# Patient Record
Sex: FEMALE | Race: WHITE | Employment: UNEMPLOYED | URBAN - METROPOLITAN AREA
[De-identification: names, ages, dates, MRNs, and addresses within clinical notes are randomized per-mention and may not be internally consistent; named-entity substitution may affect disease eponyms.]

---

## 2018-11-09 PROBLEM — J45.909 ASTHMA AFFECTING PREGNANCY, ANTEPARTUM: Status: ACTIVE | Noted: 2018-11-09

## 2018-11-09 PROBLEM — Z34.01 PRIMIGRAVIDA IN FIRST TRIMESTER: Status: ACTIVE | Noted: 2018-11-09

## 2018-11-09 PROBLEM — O99.519 ASTHMA AFFECTING PREGNANCY, ANTEPARTUM: Status: ACTIVE | Noted: 2018-11-09

## 2018-11-15 PROBLEM — O99.321 DRUG USE AFFECTING PREGNANCY IN FIRST TRIMESTER: Status: ACTIVE | Noted: 2018-11-15

## 2018-11-15 PROBLEM — Z28.39 RUBELLA NON-IMMUNE STATUS, ANTEPARTUM: Status: ACTIVE | Noted: 2018-11-15

## 2018-11-15 PROBLEM — O09.899 RUBELLA NON-IMMUNE STATUS, ANTEPARTUM: Status: ACTIVE | Noted: 2018-11-15

## 2018-12-07 PROBLEM — O98.311: Status: ACTIVE | Noted: 2018-12-07

## 2018-12-07 PROBLEM — A60.09: Status: ACTIVE | Noted: 2018-12-07

## 2019-01-04 PROBLEM — O98.512 HERPES SIMPLEX VIRUS TYPE 2 (HSV-2) INFECTION AFFECTING PREGNANCY IN SECOND TRIMESTER: Status: ACTIVE | Noted: 2018-12-07

## 2019-01-04 PROBLEM — Z34.02 PRIMIGRAVIDA IN SECOND TRIMESTER: Status: ACTIVE | Noted: 2018-11-09

## 2019-01-04 PROBLEM — B00.9 HERPES SIMPLEX VIRUS TYPE 2 (HSV-2) INFECTION AFFECTING PREGNANCY IN SECOND TRIMESTER: Status: ACTIVE | Noted: 2018-12-07

## 2019-03-01 PROBLEM — O26.872 CERVICAL SHORTENING AFFECTING PREGNANCY IN SECOND TRIMESTER: Status: ACTIVE | Noted: 2019-03-01

## 2019-03-04 PROBLEM — O09.92 HIGH-RISK PREGNANCY IN SECOND TRIMESTER: Status: ACTIVE | Noted: 2018-11-09

## 2019-03-22 PROBLEM — J45.909 ASTHMA AFFECTING PREGNANCY, ANTEPARTUM: Status: RESOLVED | Noted: 2018-11-09 | Resolved: 2019-03-22

## 2019-03-22 PROBLEM — O99.519 ASTHMA AFFECTING PREGNANCY, ANTEPARTUM: Status: RESOLVED | Noted: 2018-11-09 | Resolved: 2019-03-22

## 2019-03-22 PROBLEM — O35.EXX0 HYDRONEPHROSIS OF FETUS IN SINGLETON PREGNANCY, ANTEPARTUM: Status: ACTIVE | Noted: 2019-03-22

## 2019-03-29 PROBLEM — O26.873 SHORT CERVIX IN THIRD TRIMESTER, ANTEPARTUM: Status: ACTIVE | Noted: 2019-03-01

## 2019-03-29 PROBLEM — Z34.03 PRIMIGRAVIDA IN THIRD TRIMESTER: Status: ACTIVE | Noted: 2018-11-09

## 2019-03-29 PROBLEM — O98.513 HERPES SIMPLEX VIRUS TYPE 2 (HSV-2) INFECTION AFFECTING PREGNANCY IN THIRD TRIMESTER: Status: ACTIVE | Noted: 2018-12-07

## 2019-04-19 PROBLEM — O09.93 HIGH-RISK PREGNANCY IN THIRD TRIMESTER: Status: ACTIVE | Noted: 2018-11-09

## 2019-04-30 ENCOUNTER — HOSPITAL ENCOUNTER (OUTPATIENT)
Age: 19
Discharge: HOME | End: 2019-04-30
Attending: OBSTETRICS & GYNECOLOGY | Admitting: OBSTETRICS & GYNECOLOGY
Payer: COMMERCIAL

## 2019-04-30 VITALS
HEART RATE: 104 BPM | TEMPERATURE: 99.6 F | DIASTOLIC BLOOD PRESSURE: 68 MMHG | HEIGHT: 60 IN | SYSTOLIC BLOOD PRESSURE: 124 MMHG | WEIGHT: 197 LBS | BODY MASS INDEX: 38.68 KG/M2

## 2019-04-30 PROBLEM — R10.11: Status: ACTIVE | Noted: 2019-04-30

## 2019-04-30 PROBLEM — O26.899: Status: ACTIVE | Noted: 2019-04-30

## 2019-04-30 LAB
ALBUMIN SERPL-MCNC: 2.5 G/DL (ref 3.5–5)
ALBUMIN/GLOB SERPL: 0.6 {RATIO}
ALP SERPL-CCNC: 75 U/L (ref 50–130)
ALT SERPL-CCNC: 21 U/L (ref 12–65)
AMYLASE SERPL-CCNC: 44 U/L (ref 25–115)
ANION GAP SERPL CALC-SCNC: 9 MMOL/L
AST SERPL-CCNC: 19 U/L (ref 15–37)
BASOPHILS # BLD: 0 K/UL (ref 0–0.2)
BASOPHILS NFR BLD: 0 % (ref 0–2)
BILIRUB SERPL-MCNC: 0.2 MG/DL (ref 0.2–1.1)
BUN SERPL-MCNC: 6 MG/DL (ref 6–23)
CALCIUM SERPL-MCNC: 8.8 MG/DL (ref 8.3–10.4)
CHLORIDE SERPL-SCNC: 106 MMOL/L (ref 98–107)
CO2 SERPL-SCNC: 24 MMOL/L (ref 21–32)
CREAT SERPL-MCNC: 0.42 MG/DL (ref 0.6–1)
DIFFERENTIAL METHOD BLD: ABNORMAL
EOSINOPHIL # BLD: 0.1 K/UL (ref 0–0.8)
EOSINOPHIL NFR BLD: 1 % (ref 0.5–7.8)
ERYTHROCYTE [DISTWIDTH] IN BLOOD BY AUTOMATED COUNT: 13.4 % (ref 11.9–14.6)
GLOBULIN SER CALC-MCNC: 4.2 G/DL (ref 2.3–3.5)
GLUCOSE SERPL-MCNC: 95 MG/DL (ref 65–100)
HCT VFR BLD AUTO: 34.5 % (ref 35.8–46.3)
HGB BLD-MCNC: 11.5 G/DL (ref 11.7–15.4)
IMM GRANULOCYTES # BLD AUTO: 0.1 K/UL (ref 0–0.5)
IMM GRANULOCYTES NFR BLD AUTO: 1 % (ref 0–5)
LYMPHOCYTES # BLD: 1.5 K/UL (ref 0.5–4.6)
LYMPHOCYTES NFR BLD: 13 % (ref 13–44)
MCH RBC QN AUTO: 31.3 PG (ref 26.1–32.9)
MCHC RBC AUTO-ENTMCNC: 33.3 G/DL (ref 31.4–35)
MCV RBC AUTO: 93.8 FL (ref 79.6–97.8)
MONOCYTES # BLD: 0.7 K/UL (ref 0.1–1.3)
MONOCYTES NFR BLD: 6 % (ref 4–12)
NEUTS SEG # BLD: 9.4 K/UL (ref 1.7–8.2)
NEUTS SEG NFR BLD: 80 % (ref 43–78)
NRBC # BLD: 0 K/UL (ref 0–0.2)
PLATELET # BLD AUTO: 240 K/UL (ref 150–450)
PMV BLD AUTO: 10.3 FL (ref 9.4–12.3)
POTASSIUM SERPL-SCNC: 3.2 MMOL/L (ref 3.5–5.1)
PROT SERPL-MCNC: 6.7 G/DL
RBC # BLD AUTO: 3.68 M/UL (ref 4.05–5.2)
SODIUM SERPL-SCNC: 139 MMOL/L (ref 136–145)
URATE SERPL-MCNC: 3.1 MG/DL (ref 2.6–6)
WBC # BLD AUTO: 11.8 K/UL (ref 4.3–11.1)

## 2019-04-30 PROCEDURE — 99284 EMERGENCY DEPT VISIT MOD MDM: CPT

## 2019-04-30 PROCEDURE — 84550 ASSAY OF BLOOD/URIC ACID: CPT

## 2019-04-30 PROCEDURE — 76815 OB US LIMITED FETUS(S): CPT

## 2019-04-30 PROCEDURE — 80053 COMPREHEN METABOLIC PANEL: CPT

## 2019-04-30 PROCEDURE — 59025 FETAL NON-STRESS TEST: CPT

## 2019-04-30 PROCEDURE — 82150 ASSAY OF AMYLASE: CPT

## 2019-04-30 PROCEDURE — 85025 COMPLETE CBC W/AUTO DIFF WBC: CPT

## 2019-04-30 NOTE — PROGRESS NOTES
Pt to [facility] ED with complaints of right upper quadrant pain, radiating into her back, onset at 2000 yesterday. Pt report floaters in left eye. Pt denies uc's, vaginal bleeding or leaking. + FM. dtr's 2+/2+ and absent clonus. Lungs ctab.

## 2019-04-30 NOTE — ED PROVIDER NOTES
Chief Complaint:right upper quadrant abd pain 
 
 
19 y.o. female G1 at 33w0d 
weeks gestation who is seen for moderate abdominal pain. Pt reports right upper quad abd pain since this am. Positional changes make pain worse. No alleviating factors. Good recent appetite. No nausea, vomiting, diarrhea or constipation. Pt reports seeing occasional spots. Reports sweating, but no known fever at home. Good fetal movement. No c/o of headache or swelling. HISTORY: 
 
Social History Substance and Sexual Activity Sexual Activity Yes  Partners: Male Patient's last menstrual period was 09/04/2018 (lmp unknown). Social History Socioeconomic History  Marital status: SINGLE Spouse name: Not on file  Number of children: Not on file  Years of education: Not on file  Highest education level: Not on file Occupational History  Not on file Social Needs  Financial resource strain: Not on file  Food insecurity:  
  Worry: Not on file Inability: Not on file  Transportation needs:  
  Medical: Not on file Non-medical: Not on file Tobacco Use  Smoking status: Never Smoker  Smokeless tobacco: Never Used Substance and Sexual Activity  Alcohol use: No  
  Frequency: Never  Drug use: No  
 Sexual activity: Yes  
  Partners: Male Lifestyle  Physical activity:  
  Days per week: Not on file Minutes per session: Not on file  Stress: Not on file Relationships  Social connections:  
  Talks on phone: Not on file Gets together: Not on file Attends Quaker service: Not on file Active member of club or organization: Not on file Attends meetings of clubs or organizations: Not on file Relationship status: Not on file  Intimate partner violence:  
  Fear of current or ex partner: Not on file Emotionally abused: Not on file Physically abused: Not on file Forced sexual activity: Not on file Other Topics Concern [address] Service Not Asked  Blood Transfusions Not Asked  Caffeine Concern No  
 Occupational Exposure Not Asked Sushila Graham Hazards Not Asked  Sleep Concern Not Asked  Stress Concern Not Asked  Weight Concern Not Asked  Special Diet Not Asked  Back Care Not Asked  Exercise No  
 Bike Helmet Not Asked [address] Yes  Self-Exams Yes Social History Narrative Abuse: Feels safe at home, no history of physical abuse, no history of sexual abuse History reviewed. No pertinent surgical history. Past Medical History:  
Diagnosis Date  Asthma  Herpes gestationis   
 last outbreak was 2 weeks ago  Herpes simplex virus (HSV) infection  HSV-2 seropositive ROS: 
A 12 point review of symptoms negative except for chief complaint as described above. PHYSICAL EXAM: 
Blood pressure 124/68, pulse (!) 104, temperature 99.6 °F (37.6 °C), height 5' (1.524 m), weight 89.4 kg (197 lb), last menstrual period 09/04/2018. Constitutional: The patient appears well, alert, oriented x 3. Cardiovascular: Heart RRR, no murmurs. Respiratory: Lungs clear, no respiratory distress GI: Abdomen soft, nontender except for tenderness to palpation ruq, no guarding No fundal tenderness Musculoskeletal: no cva tenderness Upper ext: no edema, reflexes +2 Lower ext: no edema, neg derek's, reflexes +2 Skin: no rashes or lesions Psychiatric:Mood/ Affect: appropriate Genitourinary: SVE:cl/th/high (external os 1, internal os closed) FHT: 120's mod variability, accels; reactive, cat 1 
TOCO:no contractions UA- sp grav greater than 1.030, +1 prot, trace ketones Brief bedside ultrasound- vertex, chanel = 12, lots of fetal movement, grade 2 ant , fundal placenta- no pain with ultrasound Recent Results (from the past 12 hour(s)) CBC WITH AUTOMATED DIFF Collection Time: 04/30/19  7:42 PM  
Result Value Ref Range WBC 11.8 (H) 4.3 - 11.1 K/uL  
 RBC 3.68 (L) 4.05 - 5.2 M/uL HGB 11.5 (L) 11.7 - 15.4 g/dL HCT 34.5 (L) 35.8 - 46.3 % MCV 93.8 79.6 - 97.8 FL  
 MCH 31.3 26.1 - 32.9 PG  
 MCHC 33.3 31.4 - 35.0 g/dL  
 RDW 13.4 11.9 - 14.6 % PLATELET 870 123 - 736 K/uL MPV 10.3 9.4 - 12.3 FL ABSOLUTE NRBC 0.00 0.0 - 0.2 K/uL  
 DF AUTOMATED NEUTROPHILS 80 (H) 43 - 78 % LYMPHOCYTES 13 13 - 44 % MONOCYTES 6 4.0 - 12.0 % EOSINOPHILS 1 0.5 - 7.8 % BASOPHILS 0 0.0 - 2.0 % IMMATURE GRANULOCYTES 1 0.0 - 5.0 %  
 ABS. NEUTROPHILS 9.4 (H) 1.7 - 8.2 K/UL  
 ABS. LYMPHOCYTES 1.5 0.5 - 4.6 K/UL  
 ABS. MONOCYTES 0.7 0.1 - 1.3 K/UL  
 ABS. EOSINOPHILS 0.1 0.0 - 0.8 K/UL  
 ABS. BASOPHILS 0.0 0.0 - 0.2 K/UL  
 ABS. IMM. GRANS. 0.1 0.0 - 0.5 K/UL METABOLIC PANEL, COMPREHENSIVE Collection Time: 19  7:42 PM  
Result Value Ref Range Sodium 139 136 - 145 mmol/L Potassium 3.2 (L) 3.5 - 5.1 mmol/L Chloride 106 98 - 107 mmol/L  
 CO2 24 21 - 32 mmol/L Anion gap 9 mmol/L Glucose 95 65 - 100 mg/dL BUN 6 6 - 23 MG/DL Creatinine 0.42 (L) 0.6 - 1.0 MG/DL  
 GFR est AA >60 >60 ml/min/1.73m2 GFR est non-AA >60 ml/min/1.73m2 Calcium 8.8 8.3 - 10.4 MG/DL Bilirubin, total 0.2 0.2 - 1.1 MG/DL  
 ALT (SGPT) 21 12 - 65 U/L  
 AST (SGOT) 19 15 - 37 U/L Alk. phosphatase 75 50 - 130 U/L Protein, total 6.7 g/dL Albumin 2.5 (L) 3.5 - 5.0 g/dL Globulin 4.2 (H) 2.3 - 3.5 g/dL A-G Ratio 0.6 AMYLASE Collection Time: 19  7:42 PM  
Result Value Ref Range Amylase 44 25 - 115 U/L  
URIC ACID Collection Time: 19  7:42 PM  
Result Value Ref Range Uric acid 3.1 2.6 - 6.0 MG/DL I personally reviewed pt's medical record including relevant labs and ultrasounds Assessment/Plan: 
 
18 yo G1 at 33 weeks with right upper quad abd pain Normal uric acid and amylase, normal lfts No obvious cholecystatis based on labs, good appetite and exam 
No evidence  labor Encouraged hydration, encouraged potassium rich foods Return if fever or increase in pain

## 2019-05-01 NOTE — PROGRESS NOTES
Patient discharged home self care. Patient verbalizes understanding of discharge instructions. Patient ambulated out

## 2019-05-01 NOTE — DISCHARGE INSTRUCTIONS
Patient Education     Patient Education     Belly Pain in Pregnancy: Care Instructions  Your Care Instructions  When you're pregnant, any belly pain can be a worry. You may not want to call your doctor about every pain you have. But you don't want to miss something that is dangerous for you or your baby. Even if it feels familiar, belly pain can mean something new when you're pregnant. It's important to know when to call your doctor. It will also help to know how to care for yourself at home when your pain is not caused by anything harmful. · When belly pain is more severe or constant, see a doctor right away. · If you're sure your belly pain is a sign of labor, call your doctor. · When belly pain is brief, it's usually a normal part of pregnancy. It might be related to changes in the growing uterus. Or it could be the stretching of ligaments called round ligaments. These ligaments help support the uterus. Round ligament pain can be on either side of your belly. It can also be felt in your hips or groin. Follow-up care is a key part of your treatment and safety. Be sure to make and go to all appointments, and call your doctor if you are having problems. It's also a good idea to know your test results and keep a list of the medicines you take. How can you tell if belly pain is a sign of labor? When belly pain is caused by labor, it can feel like mild or menstrual-like cramps in your lower belly. These cramps are probably contractions. They can happen in your second or third trimester. You may also have:  · A steady, dull ache in your lower back, pelvis, or thighs. · A feeling of pressure in your pelvis or lower belly. · Changes in your vaginal discharge or a sudden release of fluid from the vagina. If you think you are in labor, call your doctor. How can you care for yourself at home? When belly pain is mild and is not a symptom of labor:  · Rest until you feel better. · Take a warm bath.   · Think about what you drink and eat:  ¨ Drink plenty of fluids. Choose water and other caffeine-free clear liquids until you feel better. ¨ Try eating small, frequent meals. If your stomach is upset, try bland, low-fat foods like plain rice, broiled chicken, toast, and yogurt. · Think about how you move if you are having brief pains from stretching of the round ligaments. ¨ Try gentle stretching. ¨ Move a little more slowly when turning in bed or getting up from a chair, so those ligaments don't stretch quickly. ¨ Lean forward a bit if you think you are going to cough or sneeze. When should you call for help? Call 911 anytime you think you may need emergency care. For example, call if:  · You have sudden, severe pain in your belly. · You have severe vaginal bleeding. Call your doctor now or seek immediate medical care if:  · You have new or worse belly pain or cramping. · You have any vaginal bleeding. · You have a fever. · You have symptoms of preeclampsia, such as:  ¨ Sudden swelling of your face, hands, or feet. ¨ New vision problems (such as dimness or blurring). ¨ A severe headache. · You think that you may be in labor. This means that you've had at least 8 contractions within 1 hour or at least 4 contractions within 20 minutes, even after you change your position and drink fluids. · You have symptoms of a urinary tract infection. These may include:  ¨ Pain or burning when you urinate. ¨ A frequent need to urinate without being able to pass much urine. ¨ Pain in the flank, which is just below the rib cage and above the waist on either side of the back. ¨ Blood in your urine. Watch closely for changes in your health, and be sure to contact your doctor if you are worried about your or your baby's health. Where can you learn more? Go to Sm[employer] Remarketer.be  Enter B275 in the search box to learn more about \"Belly Pain in Pregnancy: Care Instructions. \"   © 9244-7188 Healthwise, Incorporated. Care instructions adapted under license by [employer] (which disclaims liability or warranty for this information). This care instruction is for use with your licensed healthcare professional. If you have questions about a medical condition or this instruction, always ask your healthcare professional. [address] any warranty or liability for your use of this information. Content Version: 36.6.592631; Current as of: 2015              Pregnancy Precautions: Care Instructions  Your Care Instructions    There is no sure way to prevent labor before your due date ( labor) or to prevent most other pregnancy problems. But there are things you can do to increase your chances of a healthy pregnancy. Go to your appointments, follow your doctor's advice, and take good care of yourself. Eat well, and exercise (if your doctor agrees). And make sure to drink plenty of water. Follow-up care is a key part of your treatment and safety. Be sure to make and go to all appointments, and call your doctor if you are having problems. It's also a good idea to know your test results and keep a list of the medicines you take. How can you care for yourself at home? · Make sure you go to your prenatal appointments. At each visit, your doctor will check your blood pressure. Your doctor will also check to see if you have protein in your urine. High blood pressure and protein in urine are signs of preeclampsia. This condition can be dangerous for you and your baby. · Drink plenty of fluids, enough so that your urine is light yellow or clear like water. Dehydration can cause contractions. If you have kidney, heart, or liver disease and have to limit fluids, talk with your doctor before you increase the amount of fluids you drink. · Tell your doctor right away if you notice any symptoms of an infection, such as:  ? Burning when you urinate.   ? A foul-smelling discharge from your vagina. ? Vaginal itching. ? Unexplained fever. ? Unusual pain or soreness in your uterus or lower belly. · Eat a balanced diet. Include plenty of foods that are high in calcium and iron. ? Foods high in calcium include milk, cheese, yogurt, almonds, and broccoli. ? Foods high in iron include red meat, shellfish, poultry, eggs, beans, raisins, whole-grain bread, and leafy green vegetables. · Do not smoke. If you need help quitting, talk to your doctor about stop-smoking programs and medicines. These can increase your chances of quitting for good. · Do not drink alcohol or use illegal drugs. · Follow your doctor's directions about activity. Your doctor will let you know how much, if any, exercise you can do. · Ask your doctor if you can have sex. If you are at risk for early labor, your doctor may ask you to not have sex. · Take care to prevent falls. During pregnancy, your joints are loose, and your balance is off. Sports such as bicycling, skiing, or in-line skating can increase your risk of falling. And don't ride horses or motorcycles, dive, water ski, scuba dive, or parachute jump while you are pregnant. · Avoid getting very hot. Do not use saunas or hot tubs. Avoid staying out in the sun in hot weather for long periods. Take acetaminophen (Tylenol) to lower a high fever. · Do not take any over-the-counter or herbal medicines or supplements without talking to your doctor or pharmacist first.  When should you call for help? Call 911 anytime you think you may need emergency care. For example, call if:    · You passed out (lost consciousness).     · You have severe vaginal bleeding.     · You have severe pain in your belly or pelvis.     · You have had fluid gushing or leaking from your vagina and you know or think the umbilical cord is bulging into your vagina. If this happens, immediately get down on your knees so your rear end (buttocks) is higher than your head.  This will decrease the pressure on the cord until help arrives.   [facility] your doctor now or seek immediate medical care if:    · You have signs of preeclampsia, such as:  ? Sudden swelling of your face, hands, or feet. ? New vision problems (such as dimness or blurring). ? A severe headache.     · You have any vaginal bleeding.     · You have belly pain or cramping.     · You have a fever.     · You have had regular contractions (with or without pain) for an hour. This means that you have 8 or more within 1 hour or 4 or more in 20 minutes after you change your position and drink fluids.     · You have a sudden release of fluid from your vagina.     · You have low back pain or pelvic pressure that does not go away.     · You notice that your baby has stopped moving or is moving much less than normal.    Watch closely for changes in your health, and be sure to contact your doctor if you have any problems. Where can you learn more? Go to http://olga lidia-annie.info/. Enter [id number] in the search box to learn more about \"Pregnancy Precautions: Care Instructions. \"  Current as of: September 5, 2018  Content Version: 11.9  © 7425-7985 [employer]. Care instructions adapted under license by [employer] (which disclaims liability or warranty for this information). If you have questions about a medical condition or this instruction, always ask your healthcare professional. [address] any warranty or liability for your use of this information.

## 2019-05-13 PROBLEM — O26.843 SIZE OF FETUS INCONSISTENT WITH DATES IN THIRD TRIMESTER: Status: ACTIVE | Noted: 2019-05-13

## 2019-05-13 PROBLEM — R10.11: Status: RESOLVED | Noted: 2019-04-30 | Resolved: 2019-05-13

## 2019-05-13 PROBLEM — O26.899: Status: RESOLVED | Noted: 2019-04-30 | Resolved: 2019-05-13

## 2019-05-14 ENCOUNTER — HOSPITAL ENCOUNTER (OUTPATIENT)
Dept: LACTATION | Age: 19
Discharge: HOME | End: 2019-05-14
Attending: OBSTETRICS & GYNECOLOGY
Payer: COMMERCIAL

## 2019-05-14 PROCEDURE — E0603 ELECTRIC BREAST PUMP: HCPCS

## 2019-05-14 NOTE — LACTATION NOTE
Prenatal Breastfeeding Consult [phone] Patient:  eLonora Dumas Gestational Age:  27 weeks today EDC:  6/18/19 Diagnosis:  V 24.1/ Z 39.1 Pregnant Preventative Prenatal Consult. Returning to work and pumping. Double Electric Breastpump  Dr. Mart Correa Patient here for education about use of a breastpump. Provided patient with double electric breastpump. Reviewed usage and indications for pumping. Demonstrated use of pump. Mother expressed understanding. Will follow up as needed.

## 2019-05-28 PROBLEM — Z22.330 GBS CARRIER: Status: ACTIVE | Noted: 2019-05-28

## 2019-06-11 NOTE — PROGRESS NOTES
Patient ID verified. Allergies, medical history, prenatal record and prior to admission medications verified. Pt instructed to be NPO after midnight. Pt instructed to arrive at hospital @0600,come to entrance C and sign in at the registration desk on the 4th floor. Patient instructed to come to hospital sooner if SROM, labor, or concerning symptoms. Patient verbalized understanding. Questions encouraged and answered.

## 2019-06-12 ENCOUNTER — ANESTHESIA EVENT (OUTPATIENT)
Dept: LABOR AND DELIVERY | Age: 19
End: 2019-06-12
Payer: COMMERCIAL

## 2019-06-12 ENCOUNTER — ANESTHESIA (OUTPATIENT)
Dept: LABOR AND DELIVERY | Age: 19
End: 2019-06-12
Payer: COMMERCIAL

## 2019-06-12 ENCOUNTER — HOSPITAL ENCOUNTER (INPATIENT)
Age: 19
LOS: 2 days | Discharge: HOME | End: 2019-06-14
Attending: OBSTETRICS & GYNECOLOGY | Admitting: OBSTETRICS & GYNECOLOGY
Payer: COMMERCIAL

## 2019-06-12 PROBLEM — Z3A.39 39 WEEKS GESTATION OF PREGNANCY: Status: ACTIVE | Noted: 2019-06-12

## 2019-06-12 LAB
ABO + RH BLD: NORMAL
ARTERIAL PATENCY WRIST A: ABNORMAL
BASE DEFICIT BLD-SCNC: 3 MMOL/L
BDY SITE: ABNORMAL
BLOOD GROUP ANTIBODIES SERPL: NORMAL
BODY TEMPERATURE: 98.6
CO2 BLD-SCNC: 24 MMOL/L
COLLECT TIME,HTIME: 839
ERYTHROCYTE [DISTWIDTH] IN BLOOD BY AUTOMATED COUNT: 13.2 % (ref 11.9–14.6)
GAS FLOW.O2 O2 DELIVERY SYS: ABNORMAL L/MIN
HCO3 BLDV-SCNC: 23.1 MMOL/L (ref 23–28)
HCT VFR BLD AUTO: 36.1 % (ref 35.8–46.3)
HGB BLD-MCNC: 12.1 G/DL (ref 11.7–15.4)
MCH RBC QN AUTO: 30.8 PG (ref 26.1–32.9)
MCHC RBC AUTO-ENTMCNC: 33.5 G/DL (ref 31.4–35)
MCV RBC AUTO: 91.9 FL (ref 79.6–97.8)
NRBC # BLD: 0 K/UL (ref 0–0.2)
PCO2 BLDCO: 44 MMHG (ref 32–68)
PH BLDCO: 7.33 [PH] (ref 7.15–7.38)
PLATELET # BLD AUTO: 216 K/UL (ref 150–450)
PMV BLD AUTO: 11.2 FL (ref 9.4–12.3)
PO2 BLDCO: 14 MMHG
RBC # BLD AUTO: 3.93 M/UL (ref 4.05–5.2)
SAO2 % BLDV: 14 % (ref 65–88)
SERVICE CMNT-IMP: ABNORMAL
SPECIMEN EXP DATE BLD: NORMAL
SPECIMEN TYPE: ABNORMAL
WBC # BLD AUTO: 9.5 K/UL (ref 4.3–11.1)

## 2019-06-12 PROCEDURE — 86900 BLOOD TYPING SEROLOGIC ABO: CPT

## 2019-06-12 PROCEDURE — 74011250636 HC RX REV CODE- 250/636

## 2019-06-12 PROCEDURE — 74011250637 HC RX REV CODE- 250/637: Performed by: ANESTHESIOLOGY

## 2019-06-12 PROCEDURE — 76060000078 HC EPIDURAL ANESTHESIA: Performed by: OBSTETRICS & GYNECOLOGY

## 2019-06-12 PROCEDURE — 77030018836 HC SOL IRR NACL ICUM -A: Performed by: OBSTETRICS & GYNECOLOGY

## 2019-06-12 PROCEDURE — 74011000250 HC RX REV CODE- 250

## 2019-06-12 PROCEDURE — 74011250636 HC RX REV CODE- 250/636: Performed by: ANESTHESIOLOGY

## 2019-06-12 PROCEDURE — 77030002933 HC SUT MCRYL J&J -A: Performed by: OBSTETRICS & GYNECOLOGY

## 2019-06-12 PROCEDURE — 74011250636 HC RX REV CODE- 250/636: Performed by: OBSTETRICS & GYNECOLOGY

## 2019-06-12 PROCEDURE — 74011250637 HC RX REV CODE- 250/637: Performed by: OBSTETRICS & GYNECOLOGY

## 2019-06-12 PROCEDURE — 77030018846 HC SOL IRR STRL H20 ICUM -A: Performed by: OBSTETRICS & GYNECOLOGY

## 2019-06-12 PROCEDURE — 75410000003 HC RECOV DEL/VAG/CSECN EA 0.5 HR: Performed by: OBSTETRICS & GYNECOLOGY

## 2019-06-12 PROCEDURE — 77030007880 HC KT SPN EPDRL BBMI -B: Performed by: NURSE ANESTHETIST, CERTIFIED REGISTERED

## 2019-06-12 PROCEDURE — 77030009363 HC CUP VAC EXTRCT CNCI -B: Performed by: OBSTETRICS & GYNECOLOGY

## 2019-06-12 PROCEDURE — 59510 CESAREAN DELIVERY: CPT | Performed by: OBSTETRICS & GYNECOLOGY

## 2019-06-12 PROCEDURE — 77030003665 HC NDL SPN BBMI -A: Performed by: NURSE ANESTHETIST, CERTIFIED REGISTERED

## 2019-06-12 PROCEDURE — 76010000391 HC C SECN FIRST 1 HR: Performed by: OBSTETRICS & GYNECOLOGY

## 2019-06-12 PROCEDURE — 77030011943: Performed by: OBSTETRICS & GYNECOLOGY

## 2019-06-12 PROCEDURE — 85027 COMPLETE CBC AUTOMATED: CPT

## 2019-06-12 PROCEDURE — 65270000029 HC RM PRIVATE

## 2019-06-12 PROCEDURE — 77030031139 HC SUT VCRL2 J&J -A: Performed by: OBSTETRICS & GYNECOLOGY

## 2019-06-12 PROCEDURE — 74011000250 HC RX REV CODE- 250: Performed by: ANESTHESIOLOGY

## 2019-06-12 PROCEDURE — 82803 BLOOD GASES ANY COMBINATION: CPT

## 2019-06-12 PROCEDURE — 77030032490 HC SLV COMPR SCD KNE COVD -B: Performed by: OBSTETRICS & GYNECOLOGY

## 2019-06-12 PROCEDURE — 4A1HXCZ MONITORING OF PRODUCTS OF CONCEPTION, CARDIAC RATE, EXTERNAL APPROACH: ICD-10-PCS | Performed by: OBSTETRICS & GYNECOLOGY

## 2019-06-12 PROCEDURE — 77030002966 HC SUT PDS J&J -A: Performed by: OBSTETRICS & GYNECOLOGY

## 2019-06-12 RX ORDER — OXYTOCIN/RINGER'S LACTATE 30/500 ML
250 PLASTIC BAG, INJECTION (ML) INTRAVENOUS ONCE
Status: DISCONTINUED | OUTPATIENT
Start: 2019-06-12 | End: 2019-06-12 | Stop reason: HOSPADM

## 2019-06-12 RX ORDER — MORPHINE SULFATE 1 MG/ML
INJECTION, SOLUTION EPIDURAL; INTRATHECAL; INTRAVENOUS
Status: COMPLETED | OUTPATIENT
Start: 2019-06-12 | End: 2019-06-12

## 2019-06-12 RX ORDER — EPHEDRINE SULFATE 50 MG/ML
INJECTION, SOLUTION INTRAVENOUS AS NEEDED
Status: DISCONTINUED | OUTPATIENT
Start: 2019-06-12 | End: 2019-06-12 | Stop reason: HOSPADM

## 2019-06-12 RX ORDER — SIMETHICONE 80 MG
80 TABLET,CHEWABLE ORAL
Status: DISCONTINUED | OUTPATIENT
Start: 2019-06-12 | End: 2019-06-14 | Stop reason: HOSPADM

## 2019-06-12 RX ORDER — HYDROMORPHONE HYDROCHLORIDE 2 MG/ML
1 INJECTION, SOLUTION INTRAMUSCULAR; INTRAVENOUS; SUBCUTANEOUS
Status: DISCONTINUED | OUTPATIENT
Start: 2019-06-12 | End: 2019-06-13 | Stop reason: ALTCHOICE

## 2019-06-12 RX ORDER — OXYCODONE HYDROCHLORIDE 5 MG/1
10 TABLET ORAL
Status: DISCONTINUED | OUTPATIENT
Start: 2019-06-12 | End: 2019-06-13 | Stop reason: ALTCHOICE

## 2019-06-12 RX ORDER — SODIUM CHLORIDE 0.9 % (FLUSH) 0.9 %
5-40 SYRINGE (ML) INJECTION EVERY 8 HOURS
Status: DISCONTINUED | OUTPATIENT
Start: 2019-06-12 | End: 2019-06-12 | Stop reason: HOSPADM

## 2019-06-12 RX ORDER — TRISODIUM CITRATE DIHYDRATE AND CITRIC ACID MONOHYDRATE 500; 334 MG/5ML; MG/5ML
30 SOLUTION ORAL ONCE
Status: COMPLETED | OUTPATIENT
Start: 2019-06-12 | End: 2019-06-12

## 2019-06-12 RX ORDER — BUPIVACAINE HYDROCHLORIDE 7.5 MG/ML
INJECTION, SOLUTION INTRASPINAL
Status: COMPLETED | OUTPATIENT
Start: 2019-06-12 | End: 2019-06-12

## 2019-06-12 RX ORDER — SODIUM CHLORIDE, SODIUM LACTATE, POTASSIUM CHLORIDE, CALCIUM CHLORIDE 600; 310; 30; 20 MG/100ML; MG/100ML; MG/100ML; MG/100ML
125 INJECTION, SOLUTION INTRAVENOUS CONTINUOUS
Status: DISCONTINUED | OUTPATIENT
Start: 2019-06-12 | End: 2019-06-12 | Stop reason: HOSPADM

## 2019-06-12 RX ORDER — DEXTROSE, SODIUM CHLORIDE, SODIUM LACTATE, POTASSIUM CHLORIDE, AND CALCIUM CHLORIDE 5; .6; .31; .03; .02 G/100ML; G/100ML; G/100ML; G/100ML; G/100ML
125 INJECTION, SOLUTION INTRAVENOUS CONTINUOUS
Status: DISCONTINUED | OUTPATIENT
Start: 2019-06-12 | End: 2019-06-12 | Stop reason: HOSPADM

## 2019-06-12 RX ORDER — OXYTOCIN/RINGER'S LACTATE 30/500 ML
PLASTIC BAG, INJECTION (ML) INTRAVENOUS
Status: DISCONTINUED | OUTPATIENT
Start: 2019-06-12 | End: 2019-06-12 | Stop reason: HOSPADM

## 2019-06-12 RX ORDER — VALACYCLOVIR HYDROCHLORIDE 500 MG/1
500 TABLET, FILM COATED ORAL 2 TIMES DAILY
Status: DISCONTINUED | OUTPATIENT
Start: 2019-06-12 | End: 2019-06-14 | Stop reason: HOSPADM

## 2019-06-12 RX ORDER — MORPHINE SULFATE 0.5 MG/ML
INJECTION, SOLUTION EPIDURAL; INTRATHECAL; INTRAVENOUS AS NEEDED
Status: DISCONTINUED | OUTPATIENT
Start: 2019-06-12 | End: 2019-06-12 | Stop reason: HOSPADM

## 2019-06-12 RX ORDER — ONDANSETRON 2 MG/ML
INJECTION INTRAMUSCULAR; INTRAVENOUS AS NEEDED
Status: DISCONTINUED | OUTPATIENT
Start: 2019-06-12 | End: 2019-06-12 | Stop reason: HOSPADM

## 2019-06-12 RX ORDER — DIPHENHYDRAMINE HYDROCHLORIDE 50 MG/ML
12.5 INJECTION, SOLUTION INTRAMUSCULAR; INTRAVENOUS
Status: DISCONTINUED | OUTPATIENT
Start: 2019-06-12 | End: 2019-06-13 | Stop reason: ALTCHOICE

## 2019-06-12 RX ORDER — SODIUM CHLORIDE 0.9 % (FLUSH) 0.9 %
5-40 SYRINGE (ML) INJECTION AS NEEDED
Status: DISCONTINUED | OUTPATIENT
Start: 2019-06-12 | End: 2019-06-12 | Stop reason: HOSPADM

## 2019-06-12 RX ORDER — NALOXONE HYDROCHLORIDE 0.4 MG/ML
0.2 INJECTION, SOLUTION INTRAMUSCULAR; INTRAVENOUS; SUBCUTANEOUS
Status: DISCONTINUED | OUTPATIENT
Start: 2019-06-12 | End: 2019-06-13 | Stop reason: ALTCHOICE

## 2019-06-12 RX ORDER — CEFAZOLIN SODIUM/WATER 2 G/20 ML
2 SYRINGE (ML) INTRAVENOUS ONCE
Status: COMPLETED | OUTPATIENT
Start: 2019-06-12 | End: 2019-06-12

## 2019-06-12 RX ORDER — SODIUM CHLORIDE, SODIUM LACTATE, POTASSIUM CHLORIDE, CALCIUM CHLORIDE 600; 310; 30; 20 MG/100ML; MG/100ML; MG/100ML; MG/100ML
25 INJECTION, SOLUTION INTRAVENOUS CONTINUOUS
Status: DISCONTINUED | OUTPATIENT
Start: 2019-06-12 | End: 2019-06-13

## 2019-06-12 RX ORDER — KETOROLAC TROMETHAMINE 30 MG/ML
30 INJECTION, SOLUTION INTRAMUSCULAR; INTRAVENOUS
Status: DISCONTINUED | OUTPATIENT
Start: 2019-06-12 | End: 2019-06-13 | Stop reason: ALTCHOICE

## 2019-06-12 RX ORDER — KETOROLAC TROMETHAMINE 30 MG/ML
INJECTION, SOLUTION INTRAMUSCULAR; INTRAVENOUS AS NEEDED
Status: DISCONTINUED | OUTPATIENT
Start: 2019-06-12 | End: 2019-06-12 | Stop reason: HOSPADM

## 2019-06-12 RX ADMIN — MORPHINE SULFATE 0.15 MG: 1 INJECTION, SOLUTION EPIDURAL; INTRATHECAL; INTRAVENOUS at 08:26

## 2019-06-12 RX ADMIN — SODIUM CHLORIDE, SODIUM LACTATE, POTASSIUM CHLORIDE, AND CALCIUM CHLORIDE 25 ML/HR: 600; 310; 30; 20 INJECTION, SOLUTION INTRAVENOUS at 09:48

## 2019-06-12 RX ADMIN — PROMETHAZINE HYDROCHLORIDE 12.5 MG: 25 INJECTION INTRAMUSCULAR; INTRAVENOUS at 15:16

## 2019-06-12 RX ADMIN — KETOROLAC TROMETHAMINE 30 MG: 30 INJECTION, SOLUTION INTRAMUSCULAR; INTRAVENOUS at 08:57

## 2019-06-12 RX ADMIN — EPHEDRINE SULFATE 25 MG: 50 INJECTION, SOLUTION INTRAVENOUS at 08:59

## 2019-06-12 RX ADMIN — KETOROLAC TROMETHAMINE 30 MG: 30 INJECTION, SOLUTION INTRAMUSCULAR at 17:27

## 2019-06-12 RX ADMIN — OXYCODONE HYDROCHLORIDE 10 MG: 5 TABLET ORAL at 10:43

## 2019-06-12 RX ADMIN — ONDANSETRON 4 MG: 2 INJECTION INTRAMUSCULAR; INTRAVENOUS at 08:40

## 2019-06-12 RX ADMIN — SODIUM CHLORIDE, SODIUM LACTATE, POTASSIUM CHLORIDE, AND CALCIUM CHLORIDE 1000 ML: 600; 310; 30; 20 INJECTION, SOLUTION INTRAVENOUS at 08:03

## 2019-06-12 RX ADMIN — HYDROMORPHONE HYDROCHLORIDE 1 MG: 2 INJECTION INTRAMUSCULAR; INTRAVENOUS; SUBCUTANEOUS at 12:21

## 2019-06-12 RX ADMIN — Medication 2 G: at 08:03

## 2019-06-12 RX ADMIN — BUPIVACAINE HYDROCHLORIDE 12 MG: 7.5 INJECTION, SOLUTION INTRASPINAL at 08:26

## 2019-06-12 RX ADMIN — MORPHINE SULFATE 150 MCG: 0.5 INJECTION, SOLUTION EPIDURAL; INTRATHECAL; INTRAVENOUS at 08:51

## 2019-06-12 RX ADMIN — EPHEDRINE SULFATE 10 MG: 50 INJECTION, SOLUTION INTRAVENOUS at 08:50

## 2019-06-12 RX ADMIN — VALACYCLOVIR HYDROCHLORIDE 500 MG: 500 TABLET, FILM COATED ORAL at 20:23

## 2019-06-12 RX ADMIN — SODIUM CITRATE AND CITRIC ACID MONOHYDRATE 30 ML: 500; 334 SOLUTION ORAL at 08:03

## 2019-06-12 RX ADMIN — OXYCODONE HYDROCHLORIDE 10 MG: 5 TABLET ORAL at 20:23

## 2019-06-12 RX ADMIN — Medication 500 ML/HR: at 08:40

## 2019-06-12 RX ADMIN — SODIUM CHLORIDE, SODIUM LACTATE, POTASSIUM CHLORIDE, AND CALCIUM CHLORIDE: 600; 310; 30; 20 INJECTION, SOLUTION INTRAVENOUS at 08:15

## 2019-06-12 RX ADMIN — EPHEDRINE SULFATE 10 MG: 50 INJECTION, SOLUTION INTRAVENOUS at 08:57

## 2019-06-12 NOTE — OP NOTES
Karolina Ospina  2000    Preop Dx:   1. IUP @ 39 1/7 weeks gestation   2. Active genital HSV    Postop Dx: Same    Procedure: Primary LTCS    Surgeon: Tom Moya      Anesthesia: spinel    EBL: 600 mL  IVF: 1000 mL  UOP: 755 mL    Complications: None    Findings:  Viable Male infant. Vtx position. APGARS 8,9.  Weight:  7 lbs, 10 oz. Normal appearing uterus, tubes and ovaries. Procedure:  Pt was taken to the operating room where spinl anesthesia was found to be adequate. She was then prepped and draped in the usual sterile fashion and placed in the supine position with a leftward tilt. A Pfannenstiel skin incision was made with the scalpel and carried down to the underlying layer of fascia with the bovie. The fascia was incised in the midline and the incision extended laterally with the mims scissors. Superior of the fascial incision was grasped with Kocher clamps and  from the rectus muscles sharply and bluntly. In a similar fashion, the inferior aspect of the fascial incision was grasped with the Kocher clamps and  from the rectus muscles sharply and bluntly. The rectus muscles were  in the midline bluntly and peritoneum entered bluntly. The peritoneal incision was extended manually. Bladder blade was inserted and lower uterine incision made with the scalpel. Incision extended manually laterally. Flat vacuum used due to fetal head size. Infants head delivered atraumatically. Nose and mouth suctioned. Cord clamped and cut. Infant handed off to the waiting NICU staff. The uterus was exteriorized and cleared of all clots and debris. Hysterotomy was closed with 0-vicryl in a running, locking fashion. A second layer of 0-vicryl was placed in a running fashion to imbricate the incision. The pelvis and gutters were cleared of all clots and debris, the uterus returned to the abdomen and hemostasis was assured throughout. Intercede was placed over the hysterotomy site. Fascial incision repaired with 0-PDS in a running fashion. Subcutaneous tissue was cleared of all clots and debris and hemostasis assured. Subcutaneous tissue reapproximated with 3-0 vicryl rapide in a running fashion. Skin closed with 4-0 monocryl in a subcuticular fashion. Pt tolerated procedure well. Sponge, lap and needle counts correct x 3. Disposition: Pt to RR stable and infant to  nursery stable.     Pathology: cord gases      Noah Hale MD  9:04 AM  19

## 2019-06-12 NOTE — L&D DELIVERY NOTE
Delivery Summary    Patient: Yanci Dawkins MRN: [id number]  SSN: [id number]    YOB: 2000  Age: 19 y.o. Sex: female        Information for the patient's :  Eliana Palomo [[id number]]       Labor Events:    Labor: No    Steroids:     Cervical Ripening Date/Time:       Cervical Ripening Type: None   Antibiotics During Labor: No   Rupture Identifier: Sac 1    Rupture Date/Time: 2019 8:37 AM   Rupture Type: AROM   Amniotic Fluid Volume: Moderate    Amniotic Fluid Description: Clear    Amniotic Fluid Odor: None    Induction: None       Induction Date/Time:        Indications for Induction:      Augmentation: None   Augmentation Date/Time:      Indications for Augmentation:     Labor complications: None       Additional complications:        Delivery Events:  Indications For Episiotomy:     Episiotomy: None   Perineal Laceration(s): None   Repaired:     Periurethral Laceration Location:      Repaired:     Labial Laceration Location:     Repaired:     Sulcal Laceration Location:     Repaired:     Vaginal Laceration Location:     Repaired:     Cervical Laceration Location:     Repaired:     Repair Suture: None   Number of Repair Packets:     Estimated Blood Loss (ml):  ml     Delivery Date: 2019    Delivery Time: 8:39 AM   Delivery Type: , Low Transverse     Details    Trial of Labor: No   Primary/Repeat: Primary   Priority: Routine   Indications:          Sex:  Male     Gestational Age: 36w3d  Delivery Clinician:  Lucila Escobedo  Living Status: Living   Delivery Location: OR            APGARS  One minute Five minutes Ten minutes   Skin color: 0   1        Heart rate: 2   2        Grimace: 2   2        Muscle tone: 2   2        Breathin   2        Totals: 8   9          Presentation: Vertex    Position:        Resuscitation Method:  Suctioning-bulb; Tactile Stimulation     Meconium Stained: None      Cord Information: 3 Vessels  Complications:    Cord around:    Delayed cord clamping? No  Cord clamped date/time:2019  8:39 AM  Disposition of Cord Blood: Lab    Blood Gases Sent?: Yes    Placenta:  Date/Time: 2019  8:40 AM  Removal: Manual Removal      Appearance:       Ruffin Measurements:  Birth Weight: 7 lb 10.2 oz (3.465 kg)      Birth Length: 1' 8.87\" (0.53 m)      Head Circumference: 1' 2.17\" (0.36 m)      Chest Circumference: 1' 0.99\" (0.33 m)     Abdominal Girth:       Other Providers:   RADHA CARTER;NOHEMI REYES;TIFFANY DAILEY;PETERSON BETANCOURT;MIRTHA DEAL;ALEXEI MONREAL;SAMIR CLARKE;ALEJANDRINA THOMPSON;SANDY BAIRD, Obstetrician;Primary Nurse;Primary Ruffin Nurse;Neonatologist;Anesthesiologist;Crna;Scrub Tech;Scrub Tech;Respiratory Therapist             Group B Strep: No results found for: GRBSEXT, GRBSEXT  Information for the patient's :  Daniella Doctor [[id number]]   No results found for: ABORH, PCTABR, PCTDIG, BILI, ABORHEXT, ABORH    Recent Labs     19  0848   PCO2CB 44   PO2CB 14   IBD 3   PTEMPI 98.6   SPECTI VENOUS CORD   PHICB 7.329   ISITE CORD   IDEV ROOM AIR   IALLEN NOT APPLICABLE

## 2019-06-12 NOTE — ROUTINE PROCESS
SBAR IN Report: Mother Verbal report received from Rosendo Norwood, [address] [place] [address] (full name & credentials) on this patient, who is now being transferred from L & D (unit) for routine progression of care. The patient is wearing a green \"Anesthesia-Duramorph\" band. Report consisted of patient's Situation, Background, Assessment and Recommendations (SBAR).  ID bands were compared with the identification form, and verified with the patient and transferring nurse. Information from the SBAR and the [place] Report was reviewed with the transferring nurse; opportunity for questions and clarification provided.

## 2019-06-12 NOTE — INTERVAL H&P NOTE
I have examined and spoken with the patient this morning. She has no new medical issues or complaints. She reports no new medicines since H&P. She again understands procedure and agrees to proceed. Yvonne Mc MD 
7:59 AM 
06/12/19

## 2019-06-12 NOTE — H&P
[facility] OB H&P      Assessment/Plan    Problem List  Date Reviewed: 2019          Codes Class    39 weeks gestation of pregnancy ICD-10-CM: Z3A.39  ICD-9-CM: V22.2         GBS carrier ICD-10-CM: Z22.330  ICD-9-CM: V02.51     Overview Signed 2019 10:16 AM by Kim Martinez NP     Tx intrapartum             Size of fetus inconsistent with dates in third trimester ICD-10-CM: O26.843  ICD-9-CM: 649.63     Overview Addendum 2019  1:34 PM by Kim Martinez NP     19:  FH 37 cm at 34 6/7 weeks, US next visit    2019: EFW 60%, AC 55%, ANDRÉS nl, VERTEX             Hydronephrosis of fetus in baum pregnancy, antepartum ICD-10-CM: O35. 8XX0  ICD-9-CM: 655.83     Overview Addendum 2019 10:22 AM by Emory Kimball MD     3/22/2019 at [facility]: Mild unilateral; reassess at [address]. 2019 at [facility]:  Prominent renal pelves (left 7.0 mm, right 5.8 mm). Extensive discussion regarding ultrasound findings. Likely etiology of moderate hydronephrosis being partial obstruction of ureter or ureteral valve stenosis. No masses in pelvis or renal cysts seen. Normal ANDRÉS. Anatomy otherwise within normal limits. No other markers of aneuploidy seen. Unilateral hydronephrosis is not a marker for aneuploidy. · Appointment with [facility] will need to be made following delivery as non-emergent outpatient follow up of  for hydronephrosis. Please contact Janki Alvarez, [phone], to schedule this prior to 's discharge home from hospital.                Short cervix in third trimester, antepartum ICD-10-CM: O26.873  ICD-9-CM: 649.73     Overview Addendum 2019 10:36 AM by Chris Nguyen MD     3/1/2019: funneling from 4.4 cm to 2.4 cm, stat referral to [facility] for appt Monday, starting Indocin 50mg Q 6 hours, strict pelvic rest, and PTL precautions    3/4/2019 at [facility]:  CL 3.3 cm, no funneling. Reports occasional \"tightening\" to lower abdomen (< 3-4x's entire pregnancy). Denies history of surgery/trauma to cervix. Completed Indocin course today. 3/22/2019 at [facility]:  AC 35%, overall 50%, ANDRÉS 19 cm, UA Dopplers WNL,CL 2.4 cm with funneling Patient reports no PTL symptoms today. Mild unilateral hydronephrosis with Left kidney 5.6cm. Right 4 cm    · PTL and cervical insufficiency precautions given. · Reassess kidneys at 28 weeks at [facility]  · Cervical Length with Primary OB for symptoms             Herpes simplex virus type 2 (HSV-2) infection affecting pregnancy in third trimester ICD-10-CM: O98.513, B00.9  ICD-9-CM: 647.63, 054.9     Overview Signed 2018 11:19 AM by Justus Godinez NP     Starting prophylaxis              Drug use affecting pregnancy in first trimester ICD-10-CM: O99.321  ICD-9-CM: 648.43, 305.90     Overview Addendum 2019 11:02 AM by Rosa Plummer MD     (+) cannabis on initial OBV -- repeat later in preg (3/29 - NEG)             Rubella non-immune status, antepartum ICD-10-CM: O99.89, Z28.3  ICD-9-CM: 646.83, V15.83     Overview Signed 11/15/2018  8:19 AM by Rosa Plummer MD     imm PP             Primigravida in third trimester ICD-10-CM: Z34.03  ICD-9-CM: V22.0     Overview Addendum 2019  9:35 AM by Rosa Plummer MD     [facility] by 8 3/7 week US (unsure LMP)  Plans breastfeeding, OCPs, TDAP recommended    19:  SVE --- 3/60/-3                   Subjective    Arlinmalinda Youpe [facility] [age] [facility] y.o.  39w1d  presented to L&D for primary C/S due to active HSV. Pt has no complaints today. Pt denies vaginal bleeding/discharge. No LOF.  +FM.       OB History    Para Term  AB Living   1 0 0 0 0 0   SAB TAB Ectopic Molar Multiple Live Births   0 0 0 0 0 0      # Outcome Date GA Lbr Ross/2nd Weight Sex Delivery Anes PTL Lv   1 Current                Past Medical History:   Diagnosis Date    Asthma     Herpes gestationis     last outbreak was 2 weeks ago    Herpes simplex virus (HSV) infection     HSV-2 seropositive        No past surgical history on file.     Family History   Problem Relation Age of Onset    Asthma Mother     No Known Problems Father     Breast Cancer Neg Hx     Ovarian Cancer Neg Hx     Uterine Cancer Neg Hx     Colon Cancer Neg Hx        Social History     Socioeconomic History    Marital status: SINGLE     Spouse name: Not on file    Number of children: Not on file    Years of education: Not on file    Highest education level: Not on file   Occupational History    Not on file   Social Needs    Financial resource strain: Not on file    Food insecurity:     Worry: Not on file     Inability: Not on file    Transportation needs:     Medical: Not on file     Non-medical: Not on file   Tobacco Use    Smoking status: Never Smoker    Smokeless tobacco: Never Used   Substance and Sexual Activity    Alcohol use: No     Frequency: Never    Drug use: No    Sexual activity: Yes     Partners: Male   Lifestyle    Physical activity:     Days per week: Not on file     Minutes per session: Not on file    Stress: Not on file   Relationships    Social connections:     Talks on phone: Not on file     Gets together: Not on file     Attends Tenriism service: Not on file     Active member of club or organization: Not on file     Attends meetings of clubs or organizations: Not on file     Relationship status: Not on file    Intimate partner violence:     Fear of current or ex partner: Not on file     Emotionally abused: Not on file     Physically abused: Not on file     Forced sexual activity: Not on file   Other Topics Concern     Service Not Asked    Blood Transfusions Not Asked    Caffeine Concern No    Occupational Exposure Not Asked   Hettie Model Hazards Not Asked    Sleep Concern Not Asked    Stress Concern Not Asked    Weight Concern Not Asked    Special Diet Not Asked    Back Care Not Asked    Exercise No    Bike Helmet Not Asked    Seat Belt Yes    Self-Exams Yes   Social History Narrative Abuse: Feels safe at home, no history of physical abuse, no history of sexual abuse       No Known Allergies      Review of Systems:    Constitutional: No fevers or chills     Prenatal: + fetal movement, no VB/DC, no LOF     CV: No chest pain or palpatations    Resp: No SOB or cough    GI: No nausea/vomiting/diarrhea/constipation    Neuro: No HA, no seizure like activity    Skin: No rashes or lesions     Breast: No breast pain    : No dysuria or hematuria      Prenatal Record Review    The prenatal record has been reviewed. Prenatal Labs:   No results found for: RUBELLAEXT, GRBSEXT, HBSAGEXT, HIVEXT, RPREXT, GONNOEXT, CHLAMEXT    Objective    Visit Vitals  /71   Pulse 98   Temp 98.5 °F (36.9 °C)   Resp 16   LMP 09/04/2018 (LMP Unknown)   Breastfeeding?  Yes         Physical Exam    Gen: alert and cooperative, NAD    HEENT: NCAT    CV: RRR    RESP: CTA bilat    ABD: Gravid, soft, NT    EXT: trace edema bilat    NEURO: No focal deficits    SKIN: No noted rashes or lesions       Susan Washburn MD  7:58 AM  06/12/19

## 2019-06-12 NOTE — ANESTHESIA PROCEDURE NOTES
Spinal Block    Start time: 6/12/2019 8:18 AM  End time: 6/12/2019 8:26 AM  Performed by: Misha Weldon MD  Authorized by: Misha Weldon MD     Pre-procedure:   Indications: primary anesthetic  Preanesthetic Checklist: patient identified, risks and benefits discussed, anesthesia consent, site marked, patient being monitored and timeout performed    Timeout Time: 08:18          Spinal Block:   Patient Position:  Seated  Prep Region:  Lumbar  Prep: chlorhexidine      Location:  L3-4  Technique:  Single shot    Local Dose (mL):  1    Needle:   Needle Type:  Pencan  Needle Gauge:  25 G  Attempts:  1      Events: CSF confirmed, no blood with aspiration and no paresthesia        Assessment:  Insertion:  Uncomplicated  Patient tolerance:  Patient tolerated the procedure well with no immediate complications

## 2019-06-12 NOTE — LACTATION NOTE

## 2019-06-12 NOTE — PROGRESS NOTES
SBAR OUT Report: Mother    Verbal report given to Chey Duke RN on this patient, who is now being transferred to MIU for routine progression of care. The patient is wearing a green \"Anesthesia-Duramorph\" band. Report consisted of patient's Situation, Background, Assessment and Recommendations (SBAR).  ID bands were compared with the identification form, and verified with the patient and receiving nurse. Information from the SBAR, OR Summary, Procedure Summary, Intake/Output and MAR and the Julio Report was reviewed with the receiving nurse; opportunity for questions and clarification provided.

## 2019-06-12 NOTE — PROGRESS NOTES
Admission assessment complete as noted. Patient oriented to room and unit. Plan of care reviewed and patient verbalizes understanding. Questions encouraged and answered. Patent encouraged to call for needs or concerns. Safety Teaching reviewed:   1. Hand hygiene prior to handling the infant. 2. Bracelets with matching numbers are placed on mother and infant  3. An infant security tag  [facility]) is placed on the infant's ankle and monitored  4. All OB nurses wear pink Employee badges - do not give your baby to anyone without proper identification. 5. Never leave the baby alone in the room. 6. The infant should be placed on their back to sleep. on a firm mattress. No toys should be placed in the crib. (safe sleep video offered to view)  7. Never shake the baby (video offered to view)  8. Infant fall prevention - do not sleep with the baby, and place the baby in the crib while ambulating. 5. Mother and Baby Care booklet given to Mother.

## 2019-06-12 NOTE — PROGRESS NOTES
delivery of viable male infant, apgars 8/9. Infant to warmer to NICU team for assessment. Initial assessment performed by Dr. Glen Li, neonatologist; see MD note.

## 2019-06-12 NOTE — PROGRESS NOTES
Pt c/o nausea, emesis bag given. Pt sitting up in bed breastfeeding. Pt requesting medication to help with nausea, primary RN made aware of the above.

## 2019-06-12 NOTE — ANESTHESIA PREPROCEDURE EVALUATION
Relevant Problems   No relevant active problems       Anesthetic History   No history of anesthetic complications            Review of Systems / Medical History  Patient summary reviewed, nursing notes reviewed and pertinent labs reviewed    Pulmonary            Asthma : well controlled       Neuro/Psych   Within defined limits           Cardiovascular                  Exercise tolerance: >4 METS     GI/Hepatic/Renal  Within defined limits              Endo/Other  Within defined limits           Other Findings              Physical Exam    Airway  Mallampati: II  TM Distance: 4 - 6 cm  Neck ROM: normal range of motion   Mouth opening: Normal     Cardiovascular    Rhythm: regular           Dental  No notable dental hx       Pulmonary  Breath sounds clear to auscultation               Abdominal  GI exam deferred       Other Findings            Anesthetic Plan    ASA: 2  Anesthesia type: spinal            Anesthetic plan and risks discussed with: Patient and Spouse

## 2019-06-12 NOTE — LACTATION NOTE
In to see mom and infant for the first time. Infant was latched on mom's left breast in the football hold and sucking rhythmically when I entered the room. Reviewed with mom and dad the expectations of the first 24 hours and answered questions. Lactation consultant will follow up tomorrow.

## 2019-06-13 LAB
ARTERIAL PATENCY WRIST A: ABNORMAL
BASE DEFICIT BLD-SCNC: 4 MMOL/L
BDY SITE: ABNORMAL
BODY TEMPERATURE: 98.6
CO2 BLD-SCNC: 27 MMOL/L
COLLECT TIME,HTIME: 839
ERYTHROCYTE [DISTWIDTH] IN BLOOD BY AUTOMATED COUNT: 13.7 % (ref 11.9–14.6)
GAS FLOW.O2 O2 DELIVERY SYS: ABNORMAL L/MIN
HCO3 BLD-SCNC: 24.9 MMOL/L (ref 22–26)
HCT VFR BLD AUTO: 28.9 % (ref 35.8–46.3)
HGB BLD-MCNC: 9.4 G/DL (ref 11.7–15.4)
MCH RBC QN AUTO: 31.1 PG (ref 26.1–32.9)
MCHC RBC AUTO-ENTMCNC: 32.5 G/DL (ref 31.4–35)
MCV RBC AUTO: 95.7 FL (ref 79.6–97.8)
NRBC # BLD: 0 K/UL (ref 0–0.2)
PCO2 BLDCO: 60 MMHG (ref 32–68)
PH BLDCO: 7.23 [PH] (ref 7.15–7.38)
PLATELET # BLD AUTO: 153 K/UL (ref 150–450)
PMV BLD AUTO: 11 FL (ref 9.4–12.3)
PO2 BLDCO: 9 MMHG
RBC # BLD AUTO: 3.02 M/UL (ref 4.05–5.2)
SAO2 % BLD: 6 % (ref 95–98)
SERVICE CMNT-IMP: ABNORMAL
SPECIMEN TYPE: ABNORMAL
WBC # BLD AUTO: 8.3 K/UL (ref 4.3–11.1)

## 2019-06-13 PROCEDURE — 74011250636 HC RX REV CODE- 250/636: Performed by: ANESTHESIOLOGY

## 2019-06-13 PROCEDURE — 85027 COMPLETE CBC AUTOMATED: CPT

## 2019-06-13 PROCEDURE — 36415 COLL VENOUS BLD VENIPUNCTURE: CPT

## 2019-06-13 PROCEDURE — 74011250637 HC RX REV CODE- 250/637: Performed by: OBSTETRICS & GYNECOLOGY

## 2019-06-13 PROCEDURE — 74011250637 HC RX REV CODE- 250/637: Performed by: ANESTHESIOLOGY

## 2019-06-13 PROCEDURE — 65270000029 HC RM PRIVATE

## 2019-06-13 RX ORDER — OXYCODONE HYDROCHLORIDE 5 MG/1
5-10 TABLET ORAL
Status: DISCONTINUED | OUTPATIENT
Start: 2019-06-13 | End: 2019-06-14 | Stop reason: HOSPADM

## 2019-06-13 RX ORDER — ALBUTEROL SULFATE 0.83 MG/ML
2.5 SOLUTION RESPIRATORY (INHALATION)
Status: DISCONTINUED | OUTPATIENT
Start: 2019-06-13 | End: 2019-06-14 | Stop reason: HOSPADM

## 2019-06-13 RX ORDER — SODIUM CHLORIDE 0.9 % (FLUSH) 0.9 %
5-40 SYRINGE (ML) INJECTION EVERY 8 HOURS
Status: DISCONTINUED | OUTPATIENT
Start: 2019-06-13 | End: 2019-06-13 | Stop reason: ALTCHOICE

## 2019-06-13 RX ORDER — ZOLPIDEM TARTRATE 5 MG/1
5 TABLET ORAL
Status: DISCONTINUED | OUTPATIENT
Start: 2019-06-13 | End: 2019-06-14 | Stop reason: HOSPADM

## 2019-06-13 RX ORDER — PRENATAL VIT 96/IRON FUM/FOLIC 27MG-0.8MG
1 TABLET ORAL DAILY
Status: DISCONTINUED | OUTPATIENT
Start: 2019-06-13 | End: 2019-06-13 | Stop reason: ALTCHOICE

## 2019-06-13 RX ORDER — SODIUM CHLORIDE, SODIUM LACTATE, POTASSIUM CHLORIDE, CALCIUM CHLORIDE 600; 310; 30; 20 MG/100ML; MG/100ML; MG/100ML; MG/100ML
150 INJECTION, SOLUTION INTRAVENOUS CONTINUOUS
Status: DISCONTINUED | OUTPATIENT
Start: 2019-06-13 | End: 2019-06-13 | Stop reason: ALTCHOICE

## 2019-06-13 RX ORDER — IBUPROFEN 600 MG/1
600 TABLET ORAL
Status: DISCONTINUED | OUTPATIENT
Start: 2019-06-13 | End: 2019-06-14 | Stop reason: HOSPADM

## 2019-06-13 RX ORDER — DOCUSATE SODIUM 100 MG/1
100 CAPSULE, LIQUID FILLED ORAL 2 TIMES DAILY
Status: DISCONTINUED | OUTPATIENT
Start: 2019-06-13 | End: 2019-06-14 | Stop reason: HOSPADM

## 2019-06-13 RX ORDER — SODIUM CHLORIDE 0.9 % (FLUSH) 0.9 %
5-40 SYRINGE (ML) INJECTION AS NEEDED
Status: DISCONTINUED | OUTPATIENT
Start: 2019-06-13 | End: 2019-06-13 | Stop reason: ALTCHOICE

## 2019-06-13 RX ORDER — MORPHINE SULFATE 10 MG/ML
5 INJECTION, SOLUTION INTRAMUSCULAR; INTRAVENOUS
Status: DISCONTINUED | OUTPATIENT
Start: 2019-06-13 | End: 2019-06-13 | Stop reason: ALTCHOICE

## 2019-06-13 RX ORDER — DIPHENHYDRAMINE HCL 25 MG
25 CAPSULE ORAL
Status: DISCONTINUED | OUTPATIENT
Start: 2019-06-13 | End: 2019-06-14 | Stop reason: HOSPADM

## 2019-06-13 RX ORDER — PRENATAL VIT 96/IRON FUM/FOLIC 27MG-0.8MG
1 TABLET ORAL DAILY
Status: DISCONTINUED | OUTPATIENT
Start: 2019-06-13 | End: 2019-06-14 | Stop reason: HOSPADM

## 2019-06-13 RX ADMIN — KETOROLAC TROMETHAMINE 30 MG: 30 INJECTION, SOLUTION INTRAMUSCULAR at 05:57

## 2019-06-13 RX ADMIN — OXYCODONE HYDROCHLORIDE 10 MG: 5 TABLET ORAL at 11:55

## 2019-06-13 RX ADMIN — VALACYCLOVIR HYDROCHLORIDE 500 MG: 500 TABLET, FILM COATED ORAL at 16:19

## 2019-06-13 RX ADMIN — OXYCODONE HYDROCHLORIDE 5 MG: 5 TABLET ORAL at 04:41

## 2019-06-13 RX ADMIN — IBUPROFEN 600 MG: 600 TABLET ORAL at 17:26

## 2019-06-13 RX ADMIN — KETOROLAC TROMETHAMINE 30 MG: 30 INJECTION, SOLUTION INTRAMUSCULAR at 00:04

## 2019-06-13 RX ADMIN — OXYCODONE HYDROCHLORIDE 5 MG: 5 TABLET ORAL at 02:20

## 2019-06-13 RX ADMIN — IBUPROFEN 600 MG: 600 TABLET ORAL at 11:58

## 2019-06-13 RX ADMIN — OXYCODONE HYDROCHLORIDE 10 MG: 5 TABLET ORAL at 21:07

## 2019-06-13 RX ADMIN — OXYCODONE HYDROCHLORIDE 10 MG: 5 TABLET ORAL at 16:18

## 2019-06-13 RX ADMIN — DOCUSATE SODIUM 100 MG: 100 CAPSULE, LIQUID FILLED ORAL at 17:26

## 2019-06-13 NOTE — PROGRESS NOTES
Chart reviewed-first time parent.  met with family and provided education on [facility].  Family declined referral for home visit.  provided informational packet on  mood disorder education/resources. Family receptive to receiving information and denied any additional needs from . Family has 's contact information should any needs/questions arise. No PCP - list of PCPs provided.     JM Murphy  [facility]   [phone]

## 2019-06-13 NOTE — PROGRESS NOTES
Patient is POD 1 s/p  and received neuraxial morphine for post-op pain control. Visit Vitals  /56 (BP 1 Location: Left arm, BP Patient Position: Sitting) Comment (BP Patient Position): sitting up in bed    Pulse 98   Temp 36.7 °C (98 °F)   Resp 16   LMP 2018 (LMP Unknown)   SpO2 96%   Breastfeeding? Yes   resting comfortably this morning, airway patent, appropriately hydrated and appears euvolemic. She reports minimal incisional pain. Patient reports minimal pruritis and minimal nausea. Her lower extremities have returned to baseline neurologically. She was satisfied with the anesthetic and reports no complications. Continue current orders.

## 2019-06-13 NOTE — LACTATION NOTE
In to follow up with mom and infant. Infant recently circumcised and asleep in the crib. Mom stated that infant latched and nursed well during the night but was sleepy. Reviewed the second night of life and answered questions. Mom stated that she feels confident and has no concerns at this time. Lactation consultant will follow up tomorrow.

## 2019-06-13 NOTE — ANESTHESIA POSTPROCEDURE EVALUATION
Procedure(s):   SECTION. spinal    Anesthesia Post Evaluation      Multimodal analgesia: multimodal analgesia used between 6 hours prior to anesthesia start to PACU discharge  Patient location during evaluation: PACU  Patient participation: complete - patient participated  Level of consciousness: awake and alert  Pain management: adequate  Airway patency: patent  Anesthetic complications: no  Cardiovascular status: acceptable  Respiratory status: acceptable  Hydration status: acceptable  Post anesthesia nausea and vomiting:  none      No vitals data found for the desired time range.

## 2019-06-13 NOTE — PROGRESS NOTES
Pt is S/P primary  at 39 1/7 weeks due to active HSV. No complaints today. Normal PO pain. No GI/ issues. Lochia < menses. No F/C. Visit Vitals  /60 (BP 1 Location: Left arm, BP Patient Position: At rest)   Pulse 98   Temp 98 °F (36.7 °C)   Resp 16   SpO2 96%   Breastfeeding? Yes        CV - RRR  LUNGS - CTA bilaterally  ABD - soft, NABS, appropriate tenderness, incision C/D/I  EXT - tr edema bilaterally    Labs:    Recent Results (from the past 24 hour(s))   BLOOD GAS, CORD BLOOD    Collection Time: 19  8:48 AM   Result Value Ref Range    Device: ROOM AIR      pH, cord blood (POC) 7.329 7. 15 - 7.38      pCO2 cord blood 44 32 - 68 mmHg    pO2 cord blood 14 mmHg    HCO3, venous (POC) 23.1 23 - 28 MMOL/L    sO2, venous (POC) 14 (L) 65 - 88 %    Base deficit (POC) 3 mmol/L    Allens test (POC) NOT APPLICABLE      Site CORD      Patient temp. 98.6      Specimen type (POC) VENOUS CORD      Performed by Jadene Fabry     CO2, POC 24 MMOL/L    COLLECT TIME 839     CBC W/O DIFF    Collection Time: 19  7:02 AM   Result Value Ref Range    WBC 8.3 4.3 - 11.1 K/uL    RBC 3.02 (L) 4.05 - 5.2 M/uL    HGB 9.4 (L) 11.7 - 15.4 g/dL    HCT 28.9 (L) 35.8 - 46.3 %    MCV 95.7 79.6 - 97.8 FL    MCH 31.1 26.1 - 32.9 PG    MCHC 32.5 31.4 - 35.0 g/dL    RDW 13.7 11.9 - 14.6 %    PLATELET 797 192 - 597 K/uL    MPV 11.0 9.4 - 12.3 FL    ABSOLUTE NRBC 0.00 0.0 - 0.2 K/uL       POD #1 LTCS     Pt is breast feeding and plans on P4 method for birth control. Stable, cont.  routine PP/PO care    Isa Gardiner MD  8:24 AM  19

## 2019-06-14 VITALS
RESPIRATION RATE: 18 BRPM | SYSTOLIC BLOOD PRESSURE: 86 MMHG | TEMPERATURE: 98.6 F | OXYGEN SATURATION: 96 % | HEART RATE: 97 BPM | DIASTOLIC BLOOD PRESSURE: 49 MMHG

## 2019-06-14 PROCEDURE — 74011250637 HC RX REV CODE- 250/637: Performed by: OBSTETRICS & GYNECOLOGY

## 2019-06-14 RX ORDER — IBUPROFEN 600 MG/1
600 TABLET ORAL
Qty: 60 TAB | Refills: 1 | Status: SHIPPED | OUTPATIENT
Start: 2019-06-14 | End: 2020-08-18

## 2019-06-14 RX ORDER — OXYCODONE AND ACETAMINOPHEN 5; 325 MG/1; MG/1
1 TABLET ORAL
Qty: 28 TAB | Refills: 0 | Status: SHIPPED | OUTPATIENT
Start: 2019-06-14 | End: 2019-06-21

## 2019-06-14 RX ADMIN — OXYCODONE HYDROCHLORIDE 10 MG: 5 TABLET ORAL at 00:52

## 2019-06-14 RX ADMIN — IBUPROFEN 600 MG: 600 TABLET ORAL at 00:06

## 2019-06-14 RX ADMIN — OXYCODONE HYDROCHLORIDE 10 MG: 5 TABLET ORAL at 09:51

## 2019-06-14 RX ADMIN — OXYCODONE HYDROCHLORIDE 10 MG: 5 TABLET ORAL at 06:01

## 2019-06-14 RX ADMIN — VALACYCLOVIR HYDROCHLORIDE 500 MG: 500 TABLET, FILM COATED ORAL at 09:51

## 2019-06-14 RX ADMIN — DOCUSATE SODIUM 100 MG: 100 CAPSULE, LIQUID FILLED ORAL at 09:51

## 2019-06-14 RX ADMIN — IBUPROFEN 600 MG: 600 TABLET ORAL at 06:01

## 2019-06-14 NOTE — LACTATION NOTE
This note was copied from a baby's chart. Baby very fussy at breast.  Mom reports good long feeding 3 hours ago. Baby seems interested, but will not maintain latch. Tried in football and cross cradle on L and R. Assisted mom with laid back positioning on R and he finally latched. He was still fussy when he came off after about 10 minutes. Offered to help mom pump. She declined. Will try at breast and pump if needed. Encouraged to delay pacifier use. Noted mom is an early discharge. Encouraged frequent feeding and skin to skin. Watch output. Call as needed. See progress notes for feeding plan. Paper copy given to mom.

## 2019-06-14 NOTE — LACTATION NOTE
This note was copied from a baby's chart. Individualized Feeding Plan for Breastfeeding   Lactation Services [phone]      As much as possible, hold your baby on your chest so babys bare skin is against your bare skin with a blanket covering babys back, especially 30 minutes before it is time for baby to eat. Watch for early feeding cues such as, licking lips, sucking motions, rooting, hands to mouth. Crying is a late feeding cue. Feed your baby at least 8 times in 24 hours, or more if your baby is showing feeding cues. If baby is sleepy put baby skin to skin and watch for hunger cues. To rouse baby: unwrap, undress, massage hands, feet, & back, change diaper, gently change babys position from lying to sitting. 15-20 minutes on the first breast of active breastfeeding is considered a good feeding. Good, active breastfeeding is when baby is alert, tugging the nipple, their ear may move, and you can hear swallows. Allow baby to finish the first side before changing sides. Sleeping at the breast or only brief, light sucks should not be considered a good, full breastfeed. At each feeding:  __x__1. Do Suck Practice on finger before each feeding until sucking pattern is smooth. Try using index finger. Nail down towards tongue. __x__2. Hand Express for a few minutes prior to latching to help start milk flow. __x__3. Baby needs to NURSE WELL x 15-20 minutes on at least first breast, burp and offer 2nd breast at every feeding. If no sustained latch only attempt at breast for 10 minutes. If baby does not latch on and feed well on at least one side, you should:   __x__4. Double pump for 15 minutes with breast massage and compression. Hand express for an additional 2-3 minutes per side. Pump after each feeding attempt or poor feeding, up to 8 times per day. If you are not putting baby to the breast you need to pump 8 times a day. Pump every 3 hours. __x__5.  Give baby all of the breast milk you obtain using a straight syringe or  curved syringe. If baby does NOT have enough wet and dirty diapers per day, is jaundiced/lethargic, or has significant weight loss AND you do NOT pump enough milk for each feeding (per volume listed below), formula supplementation may need to be used. Call lactation department /pediatrician if you have concerns. AVERAGE INTAKES OF COLOSTRUM BY HEALTHY  INFANTS:  Time  Day Intake (ml/feed)  Based on 8 feedings per day. 1st 24 hrs  1 2-10 ml  24-48 hrs  2 5-15 ml  48-72 hrs  3 15-30 ml (0.5-1 oz)  72-96 hrs  4 30-45 ml (1-1.5oz)                          5-6      45-60 ml (1.5-2oz)                           7          60-75 ml     By day 7, baby will need 60-75 ml or 2-2.5 oz at each feeding based on 8 feedings per day & babys weight. (1oz = 30ml). Total milk volume needed in 24 hours by Day 7 is 18-20 oz per day based on baby's birthweight of 7-10. The more often baby eats, the less volume they need per feeding. If baby is eating more often than the minimum of 8 times per day, they may take less per feeding. Comments: Use plan above as needed if baby is struggling to latch. Can double pump after some daytime feedings for 10 minutes to help milk come in.  Give all colostrum in a syringe. Use feeding plan until follow up with pediatrician. Continue to attempt at the breast for most feeds. Pump every 3 hours if no latch. Give all pumped colostrum/breastmilk at each feeding. OUTPATIENT APPOINTMENTS Available. Outpatient services are located on the 4th floor at [facility]. Check in at the 4th floor registration desk (the same one you used when you came to have your baby).   Call for questions [phone]

## 2019-06-14 NOTE — DISCHARGE INSTRUCTIONS
Patient Education         Section: What to Expect at Home  Your Recovery    A  section, or , is surgery to deliver your baby through a cut, called an incision, that the doctor makes in your lower belly and uterus. You may have some pain in your lower belly and need pain medicine for 1 to 2 weeks. You can expect some vaginal bleeding for several weeks. You will probably need about 6 weeks to fully recover. It is important to take it easy while the incision is healing. Avoid heavy lifting, strenuous activities, or exercises that strain the belly muscles while you are recovering. Ask a family member or friend for help with housework, cooking, and shopping. This care sheet gives you a general idea about how long it will take for you to recover. But each person recovers at a different pace. Follow the steps below to get better as quickly as possible. How can you care for yourself at home? Activity    · Rest when you feel tired. Getting enough sleep will help you recover.     · Try to walk each day. Start by walking a little more than you did the day before. Bit by bit, increase the amount you walk. Walking boosts blood flow and helps prevent pneumonia, constipation, and blood clots.     · Avoid strenuous activities, such as bicycle riding, jogging, weightlifting, and aerobic exercise, for 6 weeks or until your doctor says it is okay.     · Until your doctor says it is okay, do not lift anything heavier than your baby.     · Do not do sit-ups or other exercises that strain the belly muscles for 6 weeks or until your doctor says it is okay.     · Hold a pillow over your incision when you cough or take deep breaths. This will support your belly and decrease your pain.     · You may shower as usual. Pat the incision dry when you are done.     · You will have some vaginal bleeding. Wear sanitary pads.  Do not douche or use tampons until your doctor says it is okay.     · Ask your doctor when you can drive again.     · You will probably need to take at least 6 weeks off work. It depends on the type of work you do and how you feel.     · Ask your doctor when it is okay for you to have sex. Diet    · You can eat your normal diet. If your stomach is upset, try bland, low-fat foods like plain rice, broiled chicken, toast, and yogurt.     · Drink plenty of fluids (unless your doctor tells you not to).     · You may notice that your bowel movements are not regular right after your surgery. This is common. Try to avoid constipation and straining with bowel movements. You may want to take a fiber supplement every day. If you have not had a bowel movement after a couple of days, ask your doctor about taking a mild laxative.     · If you are breastfeeding, limit alcohol. Alcohol can cause a lack of energy and other health problems for the baby when a breastfeeding woman drinks heavily. It can also get in the way of a mom's ability to feed her baby or to care for the child in other ways. There isn't a lot of research about exactly how much alcohol can harm a baby. Having no alcohol is the safest choice for your baby. If you choose to have a drink now and then, have only one drink, and limit the number of occasions that you have a drink. Wait to breastfeed at least 2 hours after you have a drink to reduce the amount of alcohol the baby may get in the milk. Medicines    · Your doctor will tell you if and when you can restart your medicines. He or she will also give you instructions about taking any new medicines.     · If you take blood thinners, such as warfarin (Coumadin), clopidogrel (Plavix), or aspirin, be sure to talk to your doctor. He or she will tell you if and when to start taking those medicines again. Make sure that you understand exactly what your doctor wants you to do.     · Take pain medicines exactly as directed. ? If the doctor gave you a prescription medicine for pain, take it as prescribed. ?  If you are not taking a prescription pain medicine, ask your doctor if you can take an over-the-counter medicine.     · If you think your pain medicine is making you sick to your stomach:  ? Take your medicine after meals (unless your doctor has told you not to). ? Ask your doctor for a different pain medicine.     · If your doctor prescribed antibiotics, take them as directed. Do not stop taking them just because you feel better. You need to take the full course of antibiotics. Incision care    · If you have strips of tape on the incision, leave the tape on for a week or until it falls off.     · Wash the area daily with warm, soapy water, and pat it dry. Don't use hydrogen peroxide or alcohol, which can slow healing. You may cover the area with a gauze bandage if it weeps or rubs against clothing. Change the bandage every day.     · Keep the area clean and dry. Other instructions    · If you breastfeed your baby, you may be more comfortable while you are healing if you place the baby so that he or she is not resting on your belly. Try tucking your baby under your arm, with his or her body along the side you will be feeding on. Support your baby's upper body with your arm. With that hand you can control your baby's head to bring his or her mouth to your breast. This is sometimes called the football hold. Follow-up care is a key part of your treatment and safety. Be sure to make and go to all appointments, and call your doctor if you are having problems. It's also a good idea to know your test results and keep a list of the medicines you take. When should you call for help? Call 911 anytime you think you may need emergency care.  For example, call if:    · You passed out (lost consciousness).     · You have chest pain, are short of breath, or cough up blood.    Call your doctor now or seek immediate medical care if:    · You have pain that does not get better after you take pain medicine.     · You have severe vaginal bleeding.     · You are dizzy or lightheaded, or you feel like you may faint.     · You have new or worse pain in your belly or pelvis.     · You have loose stitches, or your incision comes open.     · You have symptoms of infection, such as:  ? Increased pain, swelling, warmth, or redness. ? Red streaks leading from the incision. ? Pus draining from the incision. ? A fever.     · You have symptoms of a blood clot in your leg (called a deep vein thrombosis), such as:  ? Pain in your calf, back of the knee, thigh, or groin. ? Redness and swelling in your leg or groin.    Watch closely for changes in your health, and be sure to contact your doctor if:    · You do not get better as expected. Where can you learn more? Go to http://olga lidia-annie.info/. Enter M806 in the search box to learn more about \" Section: What to Expect at Home. \"  Current as of: 2018  Content Version: 11.9  © 3099-2612 [employer]. Care instructions adapted under license by [employer] (which disclaims liability or warranty for this information). If you have questions about a medical condition or this instruction, always ask your healthcare professional. [address] any warranty or liability for your use of this information.

## 2019-06-14 NOTE — PROGRESS NOTES
Pt is S/P primary  at 39 1/7 weeks due to active HSV. No complaints today. Normal PO pain. No GI/ issues. Lochia < menses. No F/C. Visit Vitals  /72 (BP 1 Location: Right arm, BP Patient Position: At rest)   Pulse (!) 105   Temp 98.1 °F (36.7 °C)   Resp 18   SpO2 98%   Breastfeeding? Yes        CV - RRR  LUNGS - CTA bilaterally  ABD - soft, NABS, appropriate tenderness, incision C/D/I  EXT - tr edema bilaterally    Labs:  No results found for this or any previous visit (from the past 24 hour(s)). POD #2 LTCS     Pt is breast feeding and plans on P4 method for birth control. Stable. Desires D/C today.  Routine PP/PO instructions    America Godinez MD  8:14 AM  19

## 2019-06-24 NOTE — DISCHARGE SUMMARY
Date of Admission:  2019  6:09 AM  Date of Discharge:  2019 11:46 AM    Patient is a 19 y.o.  at 39w1d wks who was admitted for primary . The indication for  was active genital HSV. A Low Transverse  was performed with normal amount of blood loss. On post-op day #1, the patient had her catheter removed and was ambulating well in the [place]. Her post operative hemoglobin was stable. Patient had a normal post operative course. Incision stayed clean and dry, without erythema. Patient remained afebrile throughout the entire hospital stay. On day of discharge, she was discharged home in good condition with routine post  instructions. She was breast feeding the infant and plans on P4 method for birth control. Pt [facility] Discharge Meds:    Discharge Medication List as of 2019 11:23 AM      START taking these medications    Details   ibuprofen (MOTRIN) 600 mg tablet Take 1 Tab by mouth every six (6) hours as needed for Pain., Normal, Disp-60 Tab, R-1      oxyCODONE-acetaminophen (PERCOCET) 5-325 mg per tablet Take 1 Tab by mouth every six (6) hours as needed for Pain for up to 7 days. Max Daily Amount: 4 Tabs., Print, Disp-28 Tab, R-0         CONTINUE these medications which have NOT CHANGED    Details   prenatal vit-calcium-iron-fa (PRENATAL PLUS WITH CALCIUM) 27 mg iron- 1 mg tab Take 1 Tab by mouth daily. , Normal, Disp-90 Tab, R-3      albuterol (PROVENTIL HFA, VENTOLIN HFA, PROAIR HFA) 90 mcg/actuation inhaler Take 1-2 Puffs by inhalation every six (6) hours as needed for Wheezing., Normal, Disp-1 Inhaler, R-6      valACYclovir (VALTREX) 500 mg tablet Take 1 Tab by mouth two (2) times a day., Normal, Disp-60 Tab, R-11      AMBULATORY BREAST PUMP Use for nursing, Print, Disp-1 Pump, R-0             Marianne Doan MD  10:00 PM  19

## 2019-06-25 PROBLEM — Z28.39 RUBELLA NON-IMMUNE STATUS, ANTEPARTUM: Status: RESOLVED | Noted: 2018-11-15 | Resolved: 2019-06-25

## 2019-06-25 PROBLEM — O98.513 HERPES SIMPLEX VIRUS TYPE 2 (HSV-2) INFECTION AFFECTING PREGNANCY IN THIRD TRIMESTER: Status: RESOLVED | Noted: 2018-12-07 | Resolved: 2019-06-25

## 2019-06-25 PROBLEM — Z22.330 GBS CARRIER: Status: RESOLVED | Noted: 2019-05-28 | Resolved: 2019-06-25

## 2019-06-25 PROBLEM — O35.EXX0 HYDRONEPHROSIS OF FETUS IN SINGLETON PREGNANCY, ANTEPARTUM: Status: RESOLVED | Noted: 2019-03-22 | Resolved: 2019-06-25

## 2019-06-25 PROBLEM — Z3A.39 39 WEEKS GESTATION OF PREGNANCY: Status: RESOLVED | Noted: 2019-06-12 | Resolved: 2019-06-25

## 2019-06-25 PROBLEM — O26.873 SHORT CERVIX IN THIRD TRIMESTER, ANTEPARTUM: Status: RESOLVED | Noted: 2019-03-01 | Resolved: 2019-06-25

## 2019-06-25 PROBLEM — Z09 POSTOPERATIVE EXAMINATION: Status: ACTIVE | Noted: 2019-06-25

## 2019-06-25 PROBLEM — B00.9 HERPES SIMPLEX VIRUS TYPE 2 (HSV-2) INFECTION AFFECTING PREGNANCY IN THIRD TRIMESTER: Status: RESOLVED | Noted: 2018-12-07 | Resolved: 2019-06-25

## 2019-06-25 PROBLEM — E66.01 SEVERE OBESITY (HCC): Status: ACTIVE | Noted: 2019-06-25

## 2019-06-25 PROBLEM — O26.843 SIZE OF FETUS INCONSISTENT WITH DATES IN THIRD TRIMESTER: Status: RESOLVED | Noted: 2019-05-13 | Resolved: 2019-06-25

## 2019-06-25 PROBLEM — O09.899 RUBELLA NON-IMMUNE STATUS, ANTEPARTUM: Status: RESOLVED | Noted: 2018-11-15 | Resolved: 2019-06-25

## 2019-06-25 PROBLEM — Z34.03 PRIMIGRAVIDA IN THIRD TRIMESTER: Status: RESOLVED | Noted: 2018-11-09 | Resolved: 2019-06-25

## 2019-06-25 PROBLEM — O99.321 DRUG USE AFFECTING PREGNANCY IN FIRST TRIMESTER: Status: RESOLVED | Noted: 2018-11-15 | Resolved: 2019-06-25

## 2019-07-23 PROBLEM — Z09 POSTOPERATIVE EXAMINATION: Status: RESOLVED | Noted: 2019-06-25 | Resolved: 2019-07-23

## 2019-07-23 PROBLEM — A60.00 HERPES SIMPLEX INFECTION OF GENITOURINARY SYSTEM: Status: ACTIVE | Noted: 2019-07-23

## 2020-08-18 PROBLEM — Z01.419 WOMEN'S ANNUAL ROUTINE GYNECOLOGICAL EXAMINATION: Status: ACTIVE | Noted: 2020-08-18

## 2020-08-18 PROBLEM — A60.04 HERPES SIMPLEX VULVOVAGINITIS: Status: ACTIVE | Noted: 2019-07-23

## 2020-08-18 PROBLEM — B37.2 CUTANEOUS CANDIDIASIS: Status: ACTIVE | Noted: 2020-08-18

## 2022-03-18 PROBLEM — Z01.419 WOMEN'S ANNUAL ROUTINE GYNECOLOGICAL EXAMINATION: Status: ACTIVE | Noted: 2020-08-18

## 2022-03-19 PROBLEM — B37.2 CUTANEOUS CANDIDIASIS: Status: ACTIVE | Noted: 2020-08-18

## 2022-03-20 PROBLEM — A60.04 HERPES SIMPLEX VULVOVAGINITIS: Status: ACTIVE | Noted: 2019-07-23

## 2022-03-20 PROBLEM — E66.01 SEVERE OBESITY (HCC): Status: ACTIVE | Noted: 2019-06-25

## 2023-05-31 ENCOUNTER — NURSE TRIAGE (OUTPATIENT)
Dept: OTHER | Facility: CLINIC | Age: 23
End: 2023-05-31

## 2023-05-31 NOTE — TELEPHONE ENCOUNTER
Received call from [facility], caller not on line. Complaint: having sinus issues/pressure, congestion. Has asthma so sometimes will have difficulty breathing. Practice Name: [facility] telephone number verified as [phone]    Unsuccessful attempt to re-connect with caller via phone, unable to leave message for callback          Reason for Disposition   No answer. First attempt to contact caller. Follow-up call scheduled within 15 minutes.     Protocols used: No Contact or Duplicate Contact Call-ADULT-[place]

## 2023-06-13 PROBLEM — N96 HISTORY OF RECURRENT MISCARRIAGES: Status: ACTIVE | Noted: 2023-06-13

## 2023-06-13 PROBLEM — O03.9 COMPLETE SPONTANEOUS ABORTION: Status: ACTIVE | Noted: 2023-06-13

## 2023-06-16 DIAGNOSIS — O03.9 COMPLETE SPONTANEOUS ABORTION: ICD-10-CM

## 2023-06-16 LAB — HCG SERPL-ACNC: 547 MIU/ML (ref 0–6)

## 2023-06-19 ENCOUNTER — TELEPHONE (OUTPATIENT)
Dept: OBGYN CLINIC | Age: 23
End: 2023-06-19

## 2023-06-19 NOTE — TELEPHONE ENCOUNTER
----- Message from An Ragsdale MD sent at 6/19/2023  8:17 AM EDT -----  Please let pt know that her quant (preg hormone) has dropped significantly as we expected. We do need to follow this weekly until negative.   Please schedule lab only appts accordingly

## 2023-06-20 NOTE — TELEPHONE ENCOUNTER
Patient informed of results and recommendations. Patient states she will not be in town from 06/22 until 06/26. I offered her to come 06/27 but she stated she had to work. Patient is scheduled for 06/30. Patient voiced understanding.  mc

## 2023-06-30 DIAGNOSIS — O03.9 COMPLETE SPONTANEOUS ABORTION: Primary | ICD-10-CM

## 2023-06-30 DIAGNOSIS — O03.9 COMPLETE SPONTANEOUS ABORTION: ICD-10-CM

## 2023-06-30 LAB — HCG SERPL-ACNC: 32 MIU/ML (ref 0–6)

## 2023-07-02 ENCOUNTER — TELEPHONE (OUTPATIENT)
Dept: OBGYN CLINIC | Age: 23
End: 2023-07-02

## 2023-07-02 DIAGNOSIS — O03.9 COMPLETE SPONTANEOUS ABORTION: Primary | ICD-10-CM

## 2023-07-03 NOTE — TELEPHONE ENCOUNTER
Called patient updated her on lab result and helped schedule repeat bHCG. Patient could not do it sooner due to being out of town until 7/18/2023.

## 2023-07-19 DIAGNOSIS — O03.9 COMPLETE SPONTANEOUS ABORTION: ICD-10-CM

## 2023-07-19 LAB — HCG SERPL-ACNC: <1 MIU/ML (ref 0–6)

## 2023-07-20 ENCOUNTER — TELEPHONE (OUTPATIENT)
Dept: OBGYN CLINIC | Age: 23
End: 2023-07-20

## 2023-07-20 NOTE — TELEPHONE ENCOUNTER
bHCG down to zero following miscarriage. Please schedule followup as she is due for pap. Im not sure if she is planning to try to conceive again or desires birth control but we can discuss at followup visit.

## 2023-10-09 ENCOUNTER — TELEPHONE (OUTPATIENT)
Dept: OBGYN CLINIC | Age: 23
End: 2023-10-09

## 2023-10-09 DIAGNOSIS — N96 HISTORY OF RECURRENT MISCARRIAGES: ICD-10-CM

## 2023-10-09 DIAGNOSIS — N96 HISTORY OF RECURRENT MISCARRIAGES: Primary | ICD-10-CM

## 2023-10-09 LAB — HCG SERPL-ACNC: 2294 MIU/ML (ref 0–6)

## 2023-10-10 ENCOUNTER — TELEPHONE (OUTPATIENT)
Dept: OBGYN CLINIC | Age: 23
End: 2023-10-10

## 2023-10-10 DIAGNOSIS — N96 HISTORY OF RECURRENT MISCARRIAGES: Primary | ICD-10-CM

## 2023-10-10 DIAGNOSIS — O03.9 COMPLETE SPONTANEOUS ABORTION: ICD-10-CM

## 2023-10-10 NOTE — TELEPHONE ENCOUNTER
----- Message from Trini Christian MD sent at 10/10/2023  8:16 AM EDT -----  Can we make sure we add a progesterone to her lab work tomorrow please?

## 2023-10-11 DIAGNOSIS — N96 HISTORY OF RECURRENT MISCARRIAGES: ICD-10-CM

## 2023-10-11 LAB
HCG SERPL-ACNC: 4658 MIU/ML (ref 0–6)
PROGEST SERPL-MCNC: 15.09 NG/ML

## 2023-10-12 ENCOUNTER — TELEPHONE (OUTPATIENT)
Dept: OBGYN CLINIC | Age: 23
End: 2023-10-12

## 2023-10-12 NOTE — TELEPHONE ENCOUNTER
Called patient, updated her on lab results and confirmed her NOB appointment on 10/19/2023. Patient verbalized understanding.

## 2023-10-12 NOTE — TELEPHONE ENCOUNTER
----- Message from Gordy Giron MD sent at 10/12/2023  8:16 AM EDT -----  Please let pt know that her quant has risen appropriately and her prog was wnl.   We will see her for her regularly scheduled appt with US

## 2023-10-19 ENCOUNTER — ROUTINE PRENATAL (OUTPATIENT)
Dept: OBGYN CLINIC | Age: 23
End: 2023-10-19

## 2023-10-19 VITALS
HEIGHT: 60 IN | BODY MASS INDEX: 39.66 KG/M2 | SYSTOLIC BLOOD PRESSURE: 104 MMHG | DIASTOLIC BLOOD PRESSURE: 62 MMHG | WEIGHT: 202 LBS

## 2023-10-19 DIAGNOSIS — Z11.3 SCREEN FOR STD (SEXUALLY TRANSMITTED DISEASE): ICD-10-CM

## 2023-10-19 DIAGNOSIS — J45.909 ASTHMA AFFECTING PREGNANCY IN FIRST TRIMESTER: ICD-10-CM

## 2023-10-19 DIAGNOSIS — Z12.4 PAP SMEAR FOR CERVICAL CANCER SCREENING: ICD-10-CM

## 2023-10-19 DIAGNOSIS — O36.80X0 ENCOUNTER TO DETERMINE FETAL VIABILITY OF PREGNANCY, SINGLE OR UNSPECIFIED FETUS: Primary | ICD-10-CM

## 2023-10-19 DIAGNOSIS — O09.291 HISTORY OF PRIOR PREGNANCY WITH SHORT CERVIX, CURRENTLY PREGNANT IN FIRST TRIMESTER: ICD-10-CM

## 2023-10-19 DIAGNOSIS — O46.8X1 SUBCHORIONIC HEMORRHAGE OF PLACENTA IN FIRST TRIMESTER, SINGLE OR UNSPECIFIED FETUS: ICD-10-CM

## 2023-10-19 DIAGNOSIS — O34.219 PREVIOUS CESAREAN SECTION COMPLICATING PREGNANCY: ICD-10-CM

## 2023-10-19 DIAGNOSIS — B00.9 HERPES VIRUS INFECTION IN MOTHER DURING FIRST TRIMESTER OF PREGNANCY: ICD-10-CM

## 2023-10-19 DIAGNOSIS — Z34.81 MULTIGRAVIDA IN FIRST TRIMESTER: ICD-10-CM

## 2023-10-19 DIAGNOSIS — O99.511 ASTHMA AFFECTING PREGNANCY IN FIRST TRIMESTER: ICD-10-CM

## 2023-10-19 DIAGNOSIS — O98.511 HERPES VIRUS INFECTION IN MOTHER DURING FIRST TRIMESTER OF PREGNANCY: ICD-10-CM

## 2023-10-19 DIAGNOSIS — O41.8X10 SUBCHORIONIC HEMORRHAGE OF PLACENTA IN FIRST TRIMESTER, SINGLE OR UNSPECIFIED FETUS: ICD-10-CM

## 2023-10-19 PROBLEM — E66.01 SEVERE OBESITY (HCC): Status: RESOLVED | Noted: 2019-06-25 | Resolved: 2023-10-19

## 2023-10-19 PROBLEM — A60.04 HERPES SIMPLEX VULVOVAGINITIS: Status: RESOLVED | Noted: 2019-07-23 | Resolved: 2023-10-19

## 2023-10-19 PROBLEM — O03.9 COMPLETE SPONTANEOUS ABORTION: Status: RESOLVED | Noted: 2023-06-13 | Resolved: 2023-10-19

## 2023-10-19 PROBLEM — Z01.419 WOMEN'S ANNUAL ROUTINE GYNECOLOGICAL EXAMINATION: Status: RESOLVED | Noted: 2020-08-18 | Resolved: 2023-10-19

## 2023-10-19 PROBLEM — O20.8 SUBCHORIONIC HEMORRHAGE OF PLACENTA IN FIRST TRIMESTER: Status: ACTIVE | Noted: 2023-10-19

## 2023-10-19 PROBLEM — B37.2 CUTANEOUS CANDIDIASIS: Status: RESOLVED | Noted: 2020-08-18 | Resolved: 2023-10-19

## 2023-10-19 PROCEDURE — 99902 PR PRENATAL VISIT: CPT | Performed by: NURSE PRACTITIONER

## 2023-10-19 RX ORDER — VALACYCLOVIR HYDROCHLORIDE 500 MG/1
500 TABLET, FILM COATED ORAL 2 TIMES DAILY
Qty: 60 TABLET | Refills: 6 | Status: SHIPPED | OUTPATIENT
Start: 2023-10-19

## 2023-10-19 RX ORDER — ALBUTEROL SULFATE 90 UG/1
1-2 AEROSOL, METERED RESPIRATORY (INHALATION) EVERY 4 HOURS PRN
Qty: 18 G | Refills: 3 | Status: SHIPPED | OUTPATIENT
Start: 2023-10-19

## 2023-10-19 NOTE — ASSESSMENT & PLAN NOTE
History reviewed  PNV's ordered (if not already taking)  PN labs, UDS ordered  Problem list reviewed  OB physical completed  OB prenatal packet/education reviewed: Information included discusses general pregnancy topics, fetal development, weight gain, nutrition, breastfeeding, risky behaviors, WIC, vaccinations and hospital information. RTO 4 weeks  PTL/labor precautions, [place], and pregnancy warning signs reviewed. Genetic testing - D/W pt at length genetic testing that is recommended by ACOG -- NIPT, Quad screen (for trisomies and NTD), CF, SMA. Brief discussion of these diseases - conditions that may increase risks, etiology, carrier states, fetal effects, treatment options, etc was undertaken. D/W pt that these are screening tests/carrier screening test only and are NOT mandatory. We also discuss false POS/false neg rates. It is her decision whether to have them done and how to proceed with the information afterwards.

## 2023-10-19 NOTE — RESULT ENCOUNTER NOTE
US Interpretation    Indication -   Patient Active Problem List   Diagnosis    History of recurrent miscarriages    Multigravida in first trimester    Previous  section complicating pregnancy    Herpes virus infection in mother during first trimester of pregnancy    History of prior pregnancy with short cervix, currently pregnant in first trimester    Subchorionic hemorrhage of placenta in first trimester        Ultrasound Type: Transvaginal    Findings:   CRL IS NOT CONSISTENT WITH LMP DATES   CRL IS MEASURING 6 W 1 D   NAOMIE BY TODAYS ULTRASOUND IS 2024   FHR WNL   YOLK SAC PRESENT   RIGHT OV NOT SEEN TODAY   LEFT OV WNL   SMALL BLEED SEEN LATERAL TO THE SAC MEASURING 7 X 5 MM    Assessment:   Normal first trimester IUP. Date change based on results. [facility]       My signature on the printed US report also confirms that I have ordered, reviewed and interpreted this US.     Adilene Fernandes MD   8:48 AM  10/19/23

## 2023-10-19 NOTE — ASSESSMENT & PLAN NOTE
Reviewed with patient 1% risk of uterine scar separation in labor. Also  of serious problem to mother or baby such as hemorrhage, needing blood transfusion, possible hysterectomy, maternal death, damage to baby or death of baby. Reviewed risks of repeat C/s as well. Reviewed with patient that we do not induce previous c/s with medications  She is still interested in .

## 2023-10-19 NOTE — PROGRESS NOTES
Patient comes in today for initial prenatal visit. Patient needs refill of albuterol inhaler and valtrex. Fetal Movements:  No  Contractions:  No  Vaginal Bleeding:  No  Leaking Fluid:  No  GI/ issues:  Yes-nausea, tea has been helpful. Educated patient. Drug/Alcohol 4P's Plus Screening    1. Have either of your parents ever had a problem with drugs/alcohol/prescription drugs? No  2. Does your partner have a problem with drugs/alcohol/prescription drugs? No  3. In the past, have you ever had a problem with drugs/alcohol/prescription drugs? No  4. Before you were pregnant, in the past month, have you done any drugs, drank any alcohol or abused any prescription drugs? No  If \"YES\" to any of the above, please give further details:  N/a    LAST PAP:  8/18/2020, ASCUS, HPV neg. LAST MAMMO:  never     LMP:  Patient's last menstrual period was 08/30/2023.     FAMILY HISTORY OF:   Breast Cancer:  No   Ovarian Cancer:  No   Uterine Cancer:  No   Colon Cancer:  No    Vitals:    10/19/23 0841   BP: 104/62   Site: Right Upper Arm   Position: Sitting   Weight: 91.6 kg (202 lb)   Height: 1.524 m (5')        JOAQUÍN SALES RN  10/19/23  8:52 AM

## 2023-10-19 NOTE — PROGRESS NOTES
I have reviewed the patient's visit today including history, exam and assessment by Marguarite Homans, WHNP-BC. I agree with treatment/plan as above.     Kuldip Pike MD  9:24 AM  10/19/23

## 2023-10-25 LAB
C TRACH RRNA CVX QL NAA+PROBE: NEGATIVE
CYTOLOGIST CVX/VAG CYTO: NORMAL
CYTOLOGY CVX/VAG DOC THIN PREP: NORMAL
HPV REFLEX: NORMAL
Lab: NORMAL
N GONORRHOEA RRNA CVX QL NAA+PROBE: NEGATIVE
PATH REPORT.FINAL DX SPEC: NORMAL
STAT OF ADQ CVX/VAG CYTO-IMP: NORMAL
T VAGINALIS RRNA SPEC QL NAA+PROBE: NEGATIVE

## 2023-11-27 ENCOUNTER — ROUTINE PRENATAL (OUTPATIENT)
Dept: OBGYN CLINIC | Age: 23
End: 2023-11-27

## 2023-11-27 VITALS
DIASTOLIC BLOOD PRESSURE: 66 MMHG | BODY MASS INDEX: 39.46 KG/M2 | SYSTOLIC BLOOD PRESSURE: 108 MMHG | HEIGHT: 60 IN | WEIGHT: 201 LBS

## 2023-11-27 DIAGNOSIS — O09.291 HISTORY OF PRIOR PREGNANCY WITH SHORT CERVIX, CURRENTLY PREGNANT IN FIRST TRIMESTER: ICD-10-CM

## 2023-11-27 DIAGNOSIS — O98.511 HERPES VIRUS INFECTION IN MOTHER DURING FIRST TRIMESTER OF PREGNANCY: ICD-10-CM

## 2023-11-27 DIAGNOSIS — B00.9 HERPES VIRUS INFECTION IN MOTHER DURING FIRST TRIMESTER OF PREGNANCY: ICD-10-CM

## 2023-11-27 DIAGNOSIS — O34.219 PREVIOUS CESAREAN DELIVERY, ANTEPARTUM: ICD-10-CM

## 2023-11-27 DIAGNOSIS — Z34.81 ENCOUNTER FOR SUPERVISION OF OTHER NORMAL PREGNANCY, FIRST TRIMESTER: ICD-10-CM

## 2023-11-27 DIAGNOSIS — O99.519 ASTHMA AFFECTING PREGNANCY, ANTEPARTUM: ICD-10-CM

## 2023-11-27 DIAGNOSIS — Z34.81 MULTIGRAVIDA IN FIRST TRIMESTER: ICD-10-CM

## 2023-11-27 DIAGNOSIS — J45.909 ASTHMA AFFECTING PREGNANCY, ANTEPARTUM: ICD-10-CM

## 2023-11-27 PROBLEM — O41.8X10 SUBCHORIONIC HEMORRHAGE OF PLACENTA IN FIRST TRIMESTER: Status: RESOLVED | Noted: 2023-10-19 | Resolved: 2023-11-27

## 2023-11-27 PROBLEM — O20.8 SUBCHORIONIC HEMORRHAGE OF PLACENTA IN FIRST TRIMESTER: Status: RESOLVED | Noted: 2023-10-19 | Resolved: 2023-11-27

## 2023-11-27 PROBLEM — N96 HISTORY OF RECURRENT MISCARRIAGES: Status: RESOLVED | Noted: 2023-06-13 | Resolved: 2023-11-27

## 2023-11-27 PROBLEM — O46.8X1 SUBCHORIONIC HEMORRHAGE OF PLACENTA IN FIRST TRIMESTER: Status: RESOLVED | Noted: 2023-10-19 | Resolved: 2023-11-27

## 2023-11-27 PROCEDURE — 99902 PR PRENATAL VISIT: CPT | Performed by: OBSTETRICS & GYNECOLOGY

## 2023-11-27 NOTE — PROGRESS NOTES
Patient comes in today for routine prenatal visit. Pt states she has been cramping since last night.      Fetal Movement: Yes flutters   Contractions: No  Vaginal Bleeding: No  Leaking Fluid: No  GI/: No    Vitals:    11/27/23 1101   BP: 108/66   Site: Left Upper Arm   Position: Sitting   Weight: 91.2 kg (201 lb)   Height: 1.524 m (5')

## 2023-12-01 LAB
Lab: NORMAL
NTRA FETAL FRACTION: NORMAL
NTRA GENDER OF FETUS: NORMAL
NTRA MONOSOMY X AGE-BASED RISK TEXT: NORMAL
NTRA MONOSOMY X RESULT TEXT: NORMAL
NTRA MONOSOMY X RISK SCORE TEXT: NORMAL
NTRA TRIPLOIDY RESULT TEXT: NORMAL
NTRA TRISOMY 13 AGE-BASED RISK TEXT: NORMAL
NTRA TRISOMY 13 RESULT TEXT: NORMAL
NTRA TRISOMY 13 RISK SCORE TEXT: NORMAL
NTRA TRISOMY 18 AGE-BASED RISK TEXT: NORMAL
NTRA TRISOMY 18 RESULT TEXT: NORMAL
NTRA TRISOMY 18 RISK SCORE TEXT: NORMAL
NTRA TRISOMY 21 AGE-BASED RISK TEXT: NORMAL
NTRA TRISOMY 21 RESULT TEXT: NORMAL
NTRA TRISOMY 21 RISK SCORE TEXT: NORMAL

## 2023-12-04 ENCOUNTER — TELEPHONE (OUTPATIENT)
Dept: OBGYN CLINIC | Age: 23
End: 2023-12-04

## 2023-12-04 NOTE — TELEPHONE ENCOUNTER
----- Message from Linus Potter MD sent at 12/3/2023  9:10 AM EST -----  Please let pt know that all her genetic testing was normal/negative  If she wants to know -- it's a GIRL ----- Message from Markel Reyes sent at 3/3/2023 10:11 AM CST -----  Regarding: Self : [phone]  Type: RX Refill Request    Who Called:  Self     Have you contacted your pharmacy: no     Refill    RX Name and Strength: clopidogreL (PLAVIX) 75 mg tablet    Preferred Pharmacy with phone number:   [id number] - [address] [address]  Phone: [phone] Fax: [phone]    Local or Mail Order: local     Would the patient rather a call back or a response via [facility]? Call back     Best Call Back Number: [phone]     Additional Information:     Thank you.

## 2023-12-28 ENCOUNTER — ROUTINE PRENATAL (OUTPATIENT)
Dept: OBGYN CLINIC | Age: 23
End: 2023-12-28
Payer: COMMERCIAL

## 2023-12-28 VITALS
DIASTOLIC BLOOD PRESSURE: 64 MMHG | BODY MASS INDEX: 39.46 KG/M2 | WEIGHT: 201 LBS | HEIGHT: 60 IN | SYSTOLIC BLOOD PRESSURE: 112 MMHG

## 2023-12-28 DIAGNOSIS — Z34.82 MULTIGRAVIDA IN SECOND TRIMESTER: ICD-10-CM

## 2023-12-28 DIAGNOSIS — O34.219 PREVIOUS CESAREAN DELIVERY, ANTEPARTUM: ICD-10-CM

## 2023-12-28 DIAGNOSIS — B00.9 HERPES VIRUS INFECTION IN MOTHER DURING FIRST TRIMESTER OF PREGNANCY: ICD-10-CM

## 2023-12-28 DIAGNOSIS — J45.909 ASTHMA AFFECTING PREGNANCY, ANTEPARTUM: ICD-10-CM

## 2023-12-28 DIAGNOSIS — O98.511 HERPES VIRUS INFECTION IN MOTHER DURING FIRST TRIMESTER OF PREGNANCY: ICD-10-CM

## 2023-12-28 DIAGNOSIS — O09.291 HISTORY OF PRIOR PREGNANCY WITH SHORT CERVIX, CURRENTLY PREGNANT IN FIRST TRIMESTER: Primary | ICD-10-CM

## 2023-12-28 DIAGNOSIS — O99.519 ASTHMA AFFECTING PREGNANCY, ANTEPARTUM: ICD-10-CM

## 2023-12-28 PROCEDURE — 76815 OB US LIMITED FETUS(S): CPT | Performed by: OBSTETRICS & GYNECOLOGY

## 2023-12-28 PROCEDURE — 0502F SUBSEQUENT PRENATAL CARE: CPT | Performed by: NURSE PRACTITIONER

## 2023-12-28 PROCEDURE — 76817 TRANSVAGINAL US OBSTETRIC: CPT | Performed by: OBSTETRICS & GYNECOLOGY

## 2023-12-28 NOTE — PROGRESS NOTES
Patient comes in today for routine prenatal visit. Fetal Movement: Yes-flutters   Contractions: No  Vaginal Bleeding: No  Leaking Fluid: No  GI/: yes-nausea, patient states tea is helpful.      Vitals:    12/28/23 1109   BP: 112/64   Site: Left Upper Arm   Position: Sitting   Weight: 91.2 kg (201 lb)   Height: 1.524 m (5')

## 2023-12-28 NOTE — ASSESSMENT & PLAN NOTE
PTL/labor precautions, [place], and pregnancy warning signs reviewed. Pt advised to call the office at [phone] or go straight to Labor and Delivery at [facility] with any of the following concerns vaginal bleeding, leaking of fluid, neal regularly Q 5-7 minutes for over an hour or not feeling the baby move.    RTO 2 weeks cervical length only, 4 weeks OBV, anatomy US  Afp today

## 2023-12-28 NOTE — PROGRESS NOTES
I have reviewed the patient's visit today including history, exam and assessment by MANNIE Bai. I agree with treatment/plan as above.     Ida Ortiz MD  2:12 PM  12/28/23

## 2023-12-28 NOTE — PROGRESS NOTES
This is a 20 y.o.  [id number] at 16w1d for routine OB visit. Her Estimated Due Date is 2024, by Ultrasound    Denies leaking of fluid, vaginal bleeding, or regular contractions. Current Outpatient Medications on File Prior to Visit   Medication Sig Dispense Refill    Prenatal Vit w/Po-Jktrvnzuk-OD (PNV PO) Take by mouth      albuterol sulfate HFA (PROVENTIL;VENTOLIN;PROAIR) 108 (90 Base) MCG/ACT inhaler Inhale 1-2 puffs into the lungs every 4 hours as needed for Shortness of Breath or Wheezing 18 g 3    valACYclovir (VALTREX) 500 MG tablet Take 1 tablet by mouth 2 times daily 60 tablet 6     No current facility-administered medications on file prior to visit.        No Known Allergies    OB History    Para Term  AB Living   4 1 1 0 2 1   SAB IAB Ectopic Molar Multiple Live Births   2 0 0 0 0 1       # 1 - Date: 19, Sex: Male, Weight: 3.465 kg (7 lb 10.2 oz), GA: 39w1d, Delivery: , Low Transverse, Apgar1: 8, Apgar5: 9, Living: Living, Birth Comments: None    # 2 - Date: 2023, Sex: None, Weight: None, GA: None, Delivery: None, Apgar1: None, Apgar5: None, Living: None, Birth Comments: None    # 3 - Date: 2023, Sex: None, Weight: None, GA: None, Delivery: None, Apgar1: None, Apgar5: None, Living: None, Birth Comments: None    # 4 - Date: None, Sex: None, Weight: None, GA: None, Delivery: None, Apgar1: None, Apgar5: None, Living: None, Birth Comments: None        Past Medical History:   Diagnosis Date    Asthma     Herpes gestationis     last outbreak was 2 weeks ago    Herpes simplex virus (HSV) infection     Herpes simplex vulvovaginitis 2019    noted     HSV-2 seropositive     Severe obesity ([address]) 2019       Past Surgical History:   Procedure Laterality Date     SECTION      x1       Family History   Problem Relation Age of Onset    Breast Cancer Neg Hx     Uterine Cancer Neg Hx     No Known Problems Father     Ovarian Cancer Neg Hx     Colon Cancer Neg Hx

## 2024-01-05 LAB
AFP INTERP SERPL-IMP: NORMAL
AFP MOM SERPL: 0.85
AFP SERPL-MCNC: 24.2 NG/ML
AGE AT DELIVERY: 25.5 YR
COMMENT: NORMAL
DONOR EGG?: NO
GA METHOD: NORMAL
GA: 16.1 WEEKS
IDDM PATIENT QL: NO
Lab: 201
Lab: NORMAL
MAT SCN FOR FETAL ABNORMALITIES SERPL: NORMAL
MULTIPLE PREGNANCY: NO
NEURAL TUBE DEFECT RISK FETUS: NORMAL
NUMBER OF FETUSES: NO
OTHER INDICATIONS: NO
OTHER INDICATIONS: NORMAL
PREVIOUSLY ELEVATED AFP (Y OR N): 16.1
PREVIOUSLY ELEVATED AFP (Y OR N): NO
PRIOR 1ST TRIM TESTING ?: NO
PRIOR 2ND TRIM TESTING ?: NO
PRIOR DS/NTD SCREEN CURRENT PREGNANCY?: NO
PRIOR PREGNANCY WITH DOWN SYNDROME (Y OR N): 1
PRIOR PREGNANCY WITH DOWN SYNDROME (Y OR N): NO
TYPE OF EGG DONOR: NORMAL

## 2024-01-11 ENCOUNTER — ROUTINE PRENATAL (OUTPATIENT)
Dept: OBGYN CLINIC | Age: 24
End: 2024-01-11
Payer: COMMERCIAL

## 2024-01-11 DIAGNOSIS — Z34.82 MULTIGRAVIDA IN SECOND TRIMESTER: ICD-10-CM

## 2024-01-11 DIAGNOSIS — O09.291 HISTORY OF PRIOR PREGNANCY WITH SHORT CERVIX, CURRENTLY PREGNANT IN FIRST TRIMESTER: Primary | ICD-10-CM

## 2024-01-11 DIAGNOSIS — O34.219 PREVIOUS CESAREAN DELIVERY, ANTEPARTUM: ICD-10-CM

## 2024-01-11 PROCEDURE — 76817 TRANSVAGINAL US OBSTETRIC: CPT | Performed by: NURSE PRACTITIONER

## 2024-01-11 PROCEDURE — 76815 OB US LIMITED FETUS(S): CPT | Performed by: NURSE PRACTITIONER

## 2024-01-11 NOTE — PROGRESS NOTES
Patient seen today for US Only visit. Cervical length normal at 3.5 cm, no funneling    Closing encounter for administrative purposes only

## 2024-01-25 ENCOUNTER — ROUTINE PRENATAL (OUTPATIENT)
Dept: OBGYN CLINIC | Age: 24
End: 2024-01-25

## 2024-01-25 ENCOUNTER — PROCEDURE VISIT (OUTPATIENT)
Dept: OBGYN CLINIC | Age: 24
End: 2024-01-25
Payer: COMMERCIAL

## 2024-01-25 VITALS
WEIGHT: 206 LBS | BODY MASS INDEX: 40.44 KG/M2 | SYSTOLIC BLOOD PRESSURE: 106 MMHG | HEIGHT: 60 IN | DIASTOLIC BLOOD PRESSURE: 68 MMHG

## 2024-01-25 DIAGNOSIS — N89.8 VAGINAL DISCHARGE DURING PREGNANCY IN SECOND TRIMESTER: ICD-10-CM

## 2024-01-25 DIAGNOSIS — Z36.89 ENCOUNTER FOR FETAL ANATOMIC SURVEY: Primary | ICD-10-CM

## 2024-01-25 DIAGNOSIS — Z34.82 MULTIGRAVIDA IN SECOND TRIMESTER: ICD-10-CM

## 2024-01-25 DIAGNOSIS — O34.219 PREVIOUS CESAREAN DELIVERY, ANTEPARTUM: ICD-10-CM

## 2024-01-25 DIAGNOSIS — O26.892 VAGINAL DISCHARGE DURING PREGNANCY IN SECOND TRIMESTER: ICD-10-CM

## 2024-01-25 DIAGNOSIS — O09.299 HISTORY OF PRIOR PREGNANCY WITH SHORT CERVIX, CURRENTLY PREGNANT: ICD-10-CM

## 2024-01-25 DIAGNOSIS — O98.512 HERPES VIRUS INFECTION IN MOTHER DURING SECOND TRIMESTER OF PREGNANCY: ICD-10-CM

## 2024-01-25 DIAGNOSIS — B00.9 HERPES VIRUS INFECTION IN MOTHER DURING SECOND TRIMESTER OF PREGNANCY: ICD-10-CM

## 2024-01-25 DIAGNOSIS — O09.291 HISTORY OF PRIOR PREGNANCY WITH SHORT CERVIX, CURRENTLY PREGNANT IN FIRST TRIMESTER: ICD-10-CM

## 2024-01-25 DIAGNOSIS — O99.519 ASTHMA AFFECTING PREGNANCY, ANTEPARTUM: Primary | ICD-10-CM

## 2024-01-25 DIAGNOSIS — J45.909 ASTHMA AFFECTING PREGNANCY, ANTEPARTUM: Primary | ICD-10-CM

## 2024-01-25 PROCEDURE — 76805 OB US >/= 14 WKS SNGL FETUS: CPT | Performed by: OBSTETRICS & GYNECOLOGY

## 2024-01-25 PROCEDURE — 76817 TRANSVAGINAL US OBSTETRIC: CPT | Performed by: OBSTETRICS & GYNECOLOGY

## 2024-01-25 PROCEDURE — 0502F SUBSEQUENT PRENATAL CARE: CPT | Performed by: OBSTETRICS & GYNECOLOGY

## 2024-01-25 ASSESSMENT — PATIENT HEALTH QUESTIONNAIRE - PHQ9
SUM OF ALL RESPONSES TO PHQ QUESTIONS 1-9: 0
1. LITTLE INTEREST OR PLEASURE IN DOING THINGS: 0
SUM OF ALL RESPONSES TO PHQ QUESTIONS 1-9: 0
SUM OF ALL RESPONSES TO PHQ9 QUESTIONS 1 & 2: 0
SUM OF ALL RESPONSES TO PHQ QUESTIONS 1-9: 0
SUM OF ALL RESPONSES TO PHQ QUESTIONS 1-9: 0
2. FEELING DOWN, DEPRESSED OR HOPELESS: 0

## 2024-01-25 NOTE — PATIENT INSTRUCTIONS
We will call you if anything is abnormal from your testing today.   If you develop signs and symptoms of  labor including but not limited to regular uterine contractions every 5-7 minutes for 1 hour, vaginal bleeding or leakage of fluid please contact our office and/or seek immediate care.  Thanks for coming to see us today and letting us take care of you!

## 2024-01-25 NOTE — PROGRESS NOTES
Patient comes in today for routine prenatal visit.   Patient complains of vaginal itching that is still present after finishing monistat 7 day 2 days ago     Fetal Movement: Yes  Contractions: No  Vaginal Bleeding: No  Leaking Fluid: No  GI/: Yes-constipation-educated patient     Vitals:    01/25/24 1117   BP: 106/68   Site: Left Upper Arm   Position: Sitting   Weight: 93.4 kg (206 lb)   Height: 1.524 m (5')      Chaperone for Intimate Exam     Chaperone was offer accepted as part of the rooming process    Chaperone: Maryan Shepherd     
prodrome or lesions per pt or on exam today      History of prior pregnancy with short cervix, currently pregnant 10/19/2023     Overview Note:     G1 short cervix in 2nd trimester. 39 week delivery   PLAN: serial CL starting at 16 weeks    2023: CL reassuring  2024: CL 3.5 cm, no funneling  24:  CL 3.6 cm, no funneling       Assessment & Plan Note:     noted      Asthma affecting pregnancy, antepartum 10/19/2023     Overview Note:     Rare albuterol usage       Assessment & Plan Note:     Stable currently        Problem List Items Addressed This Visit              Previous  delivery, antepartum     noted         Asthma affecting pregnancy, antepartum - Primary     Stable currently            Other    Multigravida in second trimester     Educated patient of signs and symptoms of  labor including but not limited to regular uterine contractions every 5-7 minutes for 1 hour, vaginal bleeding or leakage of fluid to seek immediate care.          Herpes virus infection in mother during second trimester of pregnancy     No prodrome or lesions per pt or on exam today         History of prior pregnancy with short cervix, currently pregnant     noted         Vaginal discharge during pregnancy in second trimester     Nuswab sent today         Relevant Orders    NuSwab Vaginitis Plus (VG+) with Alex ([employer])        Lamberto Ace MD     11:37 AM    24

## 2024-01-28 LAB
A VAGINAE DNA VAG QL NAA+PROBE: NORMAL SCORE
BVAB2 DNA VAG QL NAA+PROBE: NORMAL SCORE
C ALBICANS DNA VAG QL NAA+PROBE: NEGATIVE
C GLABRATA DNA VAG QL NAA+PROBE: NEGATIVE
C TRACH RRNA SPEC QL NAA+PROBE: NEGATIVE
CANDIDA KRUSEI: NEGATIVE
CANDIDA LUSITANIAE, NAA: NEGATIVE
CANDIDA PARAPSILOSIS/TROPICALIS: NEGATIVE
MEGA1 DNA VAG QL NAA+PROBE: NORMAL SCORE
N GONORRHOEA RRNA SPEC QL NAA+PROBE: NEGATIVE
T VAGINALIS RRNA SPEC QL NAA+PROBE: NEGATIVE

## 2024-01-29 ENCOUNTER — TELEPHONE (OUTPATIENT)
Dept: OBGYN CLINIC | Age: 24
End: 2024-01-29

## 2024-01-29 NOTE — TELEPHONE ENCOUNTER
Called patient, patient states that approx 4 days she started feeling chills, sinus pressure, congestion, and increased SOB not completely relieved by her inhaler.    Asked patient if she had been tested for any illnesses such as COVID/FLU/Strep, patient denies any testing and has not taken any OTC medications to alleviate symptoms.     Recommended to patient that she get testing with her PCP or urgent care.     Patient verbalized understanding and confirms she will check her PCP's availability, stated to patient that if her PCP is booking out to go to urgent care for testing.

## 2024-02-12 ENCOUNTER — TELEPHONE (OUTPATIENT)
Dept: OBGYN CLINIC | Age: 24
End: 2024-02-12

## 2024-02-12 NOTE — TELEPHONE ENCOUNTER
Patient LM with her name and phone number.     I called patient to discuss her concerns. Patient states she is having hard time breathing, she has been sick for a long time, she is currently in [place], she did 2 virtual visits and got amoxicillin. She stated she is on day 4 of amoxicillin. Patient was advised to go to her nearest urgent care to be tested for COVID, Flu, etc. She was advised that someone needs to listen to her lungs to make sure it is not something worse. Patient voiced understanding. mc

## 2024-02-26 ENCOUNTER — PROCEDURE VISIT (OUTPATIENT)
Dept: OBGYN CLINIC | Age: 24
End: 2024-02-26
Payer: COMMERCIAL

## 2024-02-26 ENCOUNTER — ROUTINE PRENATAL (OUTPATIENT)
Dept: OBGYN CLINIC | Age: 24
End: 2024-02-26

## 2024-02-26 VITALS
HEIGHT: 60 IN | DIASTOLIC BLOOD PRESSURE: 64 MMHG | SYSTOLIC BLOOD PRESSURE: 106 MMHG | BODY MASS INDEX: 40.44 KG/M2 | WEIGHT: 206 LBS

## 2024-02-26 DIAGNOSIS — B00.9 HERPES VIRUS INFECTION IN MOTHER DURING SECOND TRIMESTER OF PREGNANCY: ICD-10-CM

## 2024-02-26 DIAGNOSIS — O34.219 PREVIOUS CESAREAN DELIVERY, ANTEPARTUM: ICD-10-CM

## 2024-02-26 DIAGNOSIS — Z36.2 ENCOUNTER FOR FOLLOW-UP ULTRASOUND OF FETAL ANATOMY: Primary | ICD-10-CM

## 2024-02-26 DIAGNOSIS — Z34.82 MULTIGRAVIDA IN SECOND TRIMESTER: ICD-10-CM

## 2024-02-26 DIAGNOSIS — O09.299 HISTORY OF PRIOR PREGNANCY WITH SHORT CERVIX, CURRENTLY PREGNANT: ICD-10-CM

## 2024-02-26 DIAGNOSIS — O98.512 HERPES VIRUS INFECTION IN MOTHER DURING SECOND TRIMESTER OF PREGNANCY: ICD-10-CM

## 2024-02-26 DIAGNOSIS — O99.519 ASTHMA AFFECTING PREGNANCY, ANTEPARTUM: Primary | ICD-10-CM

## 2024-02-26 DIAGNOSIS — J45.909 ASTHMA AFFECTING PREGNANCY, ANTEPARTUM: Primary | ICD-10-CM

## 2024-02-26 PROBLEM — N89.8 VAGINAL DISCHARGE DURING PREGNANCY IN SECOND TRIMESTER: Status: RESOLVED | Noted: 2024-01-25 | Resolved: 2024-02-26

## 2024-02-26 PROBLEM — O26.892 VAGINAL DISCHARGE DURING PREGNANCY IN SECOND TRIMESTER: Status: RESOLVED | Noted: 2024-01-25 | Resolved: 2024-02-26

## 2024-02-26 PROCEDURE — 0502F SUBSEQUENT PRENATAL CARE: CPT | Performed by: NURSE PRACTITIONER

## 2024-02-26 PROCEDURE — 76817 TRANSVAGINAL US OBSTETRIC: CPT | Performed by: OBSTETRICS & GYNECOLOGY

## 2024-02-26 PROCEDURE — 76816 OB US FOLLOW-UP PER FETUS: CPT | Performed by: OBSTETRICS & GYNECOLOGY

## 2024-02-26 RX ORDER — SODIUM CHLORIDE FOR INHALATION 0.9 %
VIAL, NEBULIZER (ML) INHALATION
COMMUNITY
Start: 2024-02-12

## 2024-02-26 RX ORDER — FLUTICASONE PROPIONATE 50 MCG
SPRAY, SUSPENSION (ML) NASAL
COMMUNITY

## 2024-02-26 RX ORDER — TRIAMCINOLONE ACETONIDE 0.25 MG/G
OINTMENT TOPICAL
Qty: 80 G | Refills: 0 | Status: SHIPPED | OUTPATIENT
Start: 2024-02-26 | End: 2024-03-04

## 2024-02-26 NOTE — PROGRESS NOTES
I have reviewed the patient's visit today including history, exam and assessment by MANNIE Mehta.  I agree with treatment/plan as above.    Lamberto Ace MD  9:06 AM  02/26/24

## 2024-02-26 NOTE — PROGRESS NOTES
Patient comes in today for routine prenatal visit. No complaints/concerns today. Pt would like an Eczema cream prescribed, she is using a generic brand but not helping called Eucerin.      Fetal Movement: Yes  Contractions: No  Vaginal Bleeding: No  Leaking Fluid: No  GI/: No    Vitals:    02/26/24 0823   BP: 106/64   Site: Left Upper Arm   Position: Sitting   Weight: 93.4 kg (206 lb)   Height: 1.524 m (5')

## 2024-02-26 NOTE — PROGRESS NOTES
This is a 23 y.o.   at 24w5d for routine OB visit.    Her Estimated Due Date is 2024, by Ultrasound    Denies leaking of fluid, vaginal bleeding, or regular contractions. Reports fetal movement.     Current Outpatient Medications on File Prior to Visit   Medication Sig Dispense Refill    sodium chloride nebulizer 0.9 % solution       fluticasone (FLONASE) 50 MCG/ACT nasal spray 1 spray in each nostril Nasally twice a day for 5 days      Prenatal Vit w/Ka-Dqelorgqa-LL (PNV PO) Take by mouth      albuterol sulfate HFA (PROVENTIL;VENTOLIN;PROAIR) 108 (90 Base) MCG/ACT inhaler Inhale 1-2 puffs into the lungs every 4 hours as needed for Shortness of Breath or Wheezing 18 g 3    valACYclovir (VALTREX) 500 MG tablet Take 1 tablet by mouth 2 times daily 60 tablet 6     No current facility-administered medications on file prior to visit.       No Known Allergies    OB History    Para Term  AB Living   4 1 1 0 2 1   SAB IAB Ectopic Molar Multiple Live Births   2 0 0 0 0 1       # 1 - Date: 19, Sex: Male, Weight: 3.465 kg (7 lb 10.2 oz), GA: 39w1d, Delivery: , Low Transverse, Apgar1: 8, Apgar5: 9, Living: Living, Birth Comments: None    # 2 - Date: 2023, Sex: None, Weight: None, GA: None, Delivery: None, Apgar1: None, Apgar5: None, Living: None, Birth Comments: None    # 3 - Date: 2023, Sex: None, Weight: None, GA: None, Delivery: None, Apgar1: None, Apgar5: None, Living: None, Birth Comments: None    # 4 - Date: None, Sex: None, Weight: None, GA: None, Delivery: None, Apgar1: None, Apgar5: None, Living: None, Birth Comments: None        Past Medical History:   Diagnosis Date    Asthma     Eczema     Herpes gestationis     last outbreak was 2 weeks ago    Herpes simplex virus (HSV) infection     Herpes simplex vulvovaginitis 2019    noted     HSV-2 seropositive     Severe obesity (HCC) 2019       Past Surgical History:   Procedure Laterality Date     SECTION

## 2024-02-26 NOTE — PATIENT INSTRUCTIONS
Thank you for coming.  Return to the office in 3 weeks.  Please call if you have any abdominal pain and 5 contractions in 1 hour, vaginal bleeding or spotting, or leaking of fluid.

## 2024-02-26 NOTE — ASSESSMENT & PLAN NOTE
PTL/labor precautions, FMC, and pregnancy warning signs reviewed. Pt advised to call the office at [phone] or go straight to Labor and Delivery at [facility] [place] with any of the following concerns vaginal bleeding, leaking of fluid, neal regularly Q 5-7 minutes for over an hour or not feeling the baby move.   RTO 3 weeks OBV, US for CL, glucola, cbc, tdap    Rx sent for topical eczema symptoms, eucerin not helping

## 2024-03-19 ENCOUNTER — PROCEDURE VISIT (OUTPATIENT)
Dept: OBGYN CLINIC | Age: 24
End: 2024-03-19
Payer: COMMERCIAL

## 2024-03-19 ENCOUNTER — ROUTINE PRENATAL (OUTPATIENT)
Dept: OBGYN CLINIC | Age: 24
End: 2024-03-19
Payer: COMMERCIAL

## 2024-03-19 VITALS
SYSTOLIC BLOOD PRESSURE: 112 MMHG | WEIGHT: 209 LBS | BODY MASS INDEX: 41.03 KG/M2 | DIASTOLIC BLOOD PRESSURE: 70 MMHG | HEIGHT: 60 IN

## 2024-03-19 DIAGNOSIS — Z34.83 MULTIGRAVIDA IN THIRD TRIMESTER: Primary | ICD-10-CM

## 2024-03-19 DIAGNOSIS — B00.9 HERPES VIRUS INFECTION IN MOTHER DURING THIRD TRIMESTER OF PREGNANCY: ICD-10-CM

## 2024-03-19 DIAGNOSIS — Z34.83 MULTIGRAVIDA IN THIRD TRIMESTER: ICD-10-CM

## 2024-03-19 DIAGNOSIS — O34.219 PREVIOUS CESAREAN DELIVERY, ANTEPARTUM: ICD-10-CM

## 2024-03-19 DIAGNOSIS — O09.299 HISTORY OF PRIOR PREGNANCY WITH SHORT CERVIX, CURRENTLY PREGNANT: Primary | ICD-10-CM

## 2024-03-19 DIAGNOSIS — O46.90 VAGINAL BLEEDING DURING PREGNANCY: ICD-10-CM

## 2024-03-19 DIAGNOSIS — Z34.82 MULTIGRAVIDA IN SECOND TRIMESTER: ICD-10-CM

## 2024-03-19 DIAGNOSIS — O09.299 HISTORY OF PRIOR PREGNANCY WITH SHORT CERVIX, CURRENTLY PREGNANT: ICD-10-CM

## 2024-03-19 DIAGNOSIS — O98.513 HERPES VIRUS INFECTION IN MOTHER DURING THIRD TRIMESTER OF PREGNANCY: ICD-10-CM

## 2024-03-19 LAB
BILIRUBIN, URINE, POC: NEGATIVE
BLOOD URINE, POC: NEGATIVE
ERYTHROCYTE [DISTWIDTH] IN BLOOD BY AUTOMATED COUNT: 14.2 % (ref 11.9–14.6)
GLUCOSE 1 HOUR: 147 MG/DL
GLUCOSE URINE, POC: NEGATIVE
HCT VFR BLD AUTO: 34.6 % (ref 35.8–46.3)
HGB BLD-MCNC: 11 G/DL (ref 11.7–15.4)
KETONES, URINE, POC: NEGATIVE
LEUKOCYTE ESTERASE, URINE, POC: NEGATIVE
MCH RBC QN AUTO: 29.6 PG (ref 26.1–32.9)
MCHC RBC AUTO-ENTMCNC: 31.8 G/DL (ref 31.4–35)
MCV RBC AUTO: 93 FL (ref 82–102)
NITRITE, URINE, POC: NEGATIVE
NRBC # BLD: 0 K/UL (ref 0–0.2)
PH, URINE, POC: 6.5 (ref 4.6–8)
PLATELET # BLD AUTO: 290 K/UL (ref 150–450)
PMV BLD AUTO: 10.6 FL (ref 9.4–12.3)
PROTEIN,URINE, POC: 30
RBC # BLD AUTO: 3.72 M/UL (ref 4.05–5.2)
SPECIFIC GRAVITY, URINE, POC: 1.02 (ref 1–1.03)
URINALYSIS CLARITY, POC: CLEAR
URINALYSIS COLOR, POC: YELLOW
UROBILINOGEN, POC: NORMAL
WBC # BLD AUTO: 12 K/UL (ref 4.3–11.1)

## 2024-03-19 PROCEDURE — 76817 TRANSVAGINAL US OBSTETRIC: CPT | Performed by: OBSTETRICS & GYNECOLOGY

## 2024-03-19 PROCEDURE — 81002 URINALYSIS NONAUTO W/O SCOPE: CPT | Performed by: NURSE PRACTITIONER

## 2024-03-19 PROCEDURE — 0502F SUBSEQUENT PRENATAL CARE: CPT | Performed by: NURSE PRACTITIONER

## 2024-03-19 PROCEDURE — 76816 OB US FOLLOW-UP PER FETUS: CPT | Performed by: OBSTETRICS & GYNECOLOGY

## 2024-03-19 NOTE — ASSESSMENT & PLAN NOTE
PTL/labor precautions, FMC, and pregnancy warning signs reviewed. Pt advised to call the office at [phone] or go straight to Labor and Delivery at [facility] with any of the following concerns vaginal bleeding, leaking of fluid, neal regularly Q 5-7 minutes for over an hour or not feeling the baby move.   RTO 2 weeks OBV

## 2024-03-19 NOTE — PROGRESS NOTES
Patient comes in today for routine prenatal visit.     Patient finished glucola @ 8:41am.      Patient declines TDAP in this pregnancy.     Fetal Movement: Yes  Contractions: No  Vaginal Bleeding: Yes-pink spotting last wed.-thurs., denies being sexually active and denies any UTI symptoms but noticed this spotting when using the restroom.  Leaking Fluid: No  GI/: No    Vitals:    03/19/24 0858   BP: 112/70   Site: Left Upper Arm   Position: Sitting   Weight: 94.8 kg (209 lb)   Height: 1.524 m (5')

## 2024-03-19 NOTE — ASSESSMENT & PLAN NOTE
Vag spotting x2 last week, only noticed when wiping after urination  No recent intercourse prior to seeing  Denies vaginal discharge, itching, odor, dysuria  Nuswab collected

## 2024-03-19 NOTE — PROGRESS NOTES
This is a 23 y.o.   at 27w6d for routine OB visit.    Her Estimated Due Date is 2024, by Ultrasound    Denies leaking of fluid, vaginal bleeding, or regular contractions. Reports fetal movement.     2 episodes os spotting last week, noticed link pink when wiping after urinating    Current Outpatient Medications on File Prior to Visit   Medication Sig Dispense Refill    sodium chloride nebulizer 0.9 % solution       fluticasone (FLONASE) 50 MCG/ACT nasal spray 1 spray in each nostril Nasally twice a day for 5 days      Prenatal Vit w/Vo-Ocpipixuf-ED (PNV PO) Take by mouth      albuterol sulfate HFA (PROVENTIL;VENTOLIN;PROAIR) 108 (90 Base) MCG/ACT inhaler Inhale 1-2 puffs into the lungs every 4 hours as needed for Shortness of Breath or Wheezing 18 g 3    valACYclovir (VALTREX) 500 MG tablet Take 1 tablet by mouth 2 times daily 60 tablet 6     No current facility-administered medications on file prior to visit.       No Known Allergies    OB History    Para Term  AB Living   4 1 1 0 2 1   SAB IAB Ectopic Molar Multiple Live Births   2 0 0 0 0 1       # 1 - Date: 19, Sex: Male, Weight: 3.465 kg (7 lb 10.2 oz), GA: 39w1d, Delivery: , Low Transverse, Apgar1: 8, Apgar5: 9, Living: Living, Birth Comments: None    # 2 - Date: 2023, Sex: None, Weight: None, GA: None, Delivery: None, Apgar1: None, Apgar5: None, Living: None, Birth Comments: None    # 3 - Date: 2023, Sex: None, Weight: None, GA: None, Delivery: None, Apgar1: None, Apgar5: None, Living: None, Birth Comments: None    # 4 - Date: None, Sex: None, Weight: None, GA: None, Delivery: None, Apgar1: None, Apgar5: None, Living: None, Birth Comments: None        Past Medical History:   Diagnosis Date    Asthma     Eczema     Herpes gestationis     last outbreak was 2 weeks ago    Herpes simplex virus (HSV) infection     Herpes simplex vulvovaginitis 2019    noted     HSV-2 seropositive     Severe obesity (HCC)

## 2024-03-19 NOTE — PROGRESS NOTES
Chaperone for Intimate Exam     Chaperone was offer accepted as part of the rooming process    Chaperone: Maryan Shepherd

## 2024-03-19 NOTE — PROGRESS NOTES
I have reviewed the patient's visit today including history, exam and assessment by MANNIE Mehta.  I agree with treatment/plan as above.    Lamberto Ace MD  9:44 AM  03/19/24

## 2024-03-20 ENCOUNTER — TELEPHONE (OUTPATIENT)
Dept: OBGYN CLINIC | Age: 24
End: 2024-03-20

## 2024-03-20 DIAGNOSIS — O99.810 ABNORMAL GLUCOSE AFFECTING PREGNANCY: Primary | ICD-10-CM

## 2024-03-20 LAB — RPR SER QL: NONREACTIVE

## 2024-03-20 NOTE — TELEPHONE ENCOUNTER
Patient has failed 1 hr Glucola. Needs to be scheduled for 3hr GTT.      Hgb 11. Continue taking PNV which contains iron.

## 2024-03-21 NOTE — TELEPHONE ENCOUNTER
Patient informed of results and recommendations. 3 hr GTT scheduled. Carb diet sent via My Chart. Order placed. Patient voiced understanding. winsome

## 2024-03-22 LAB
A VAGINAE DNA VAG QL NAA+PROBE: NORMAL SCORE
BVAB2 DNA VAG QL NAA+PROBE: NORMAL SCORE
C ALBICANS DNA VAG QL NAA+PROBE: NEGATIVE
C GLABRATA DNA VAG QL NAA+PROBE: NEGATIVE
C TRACH RRNA SPEC QL NAA+PROBE: NEGATIVE
MEGA1 DNA VAG QL NAA+PROBE: NORMAL SCORE
N GONORRHOEA RRNA SPEC QL NAA+PROBE: NEGATIVE
SPECIMEN SOURCE: NORMAL
T VAGINALIS RRNA SPEC QL NAA+PROBE: NEGATIVE

## 2024-03-26 ENCOUNTER — NURSE ONLY (OUTPATIENT)
Dept: OBGYN CLINIC | Age: 24
End: 2024-03-26

## 2024-03-26 DIAGNOSIS — O99.810 ABNORMAL GLUCOSE AFFECTING PREGNANCY: ICD-10-CM

## 2024-03-26 LAB
GESTATIONAL 3HR GTT: NORMAL
GLUCOSE 1H P 100 G GLC PO SERPL-MCNC: 167 MG/DL (ref 65–180)
GLUCOSE 2 HOUR: 105 MG/DL (ref 65–155)
GLUCOSE P FAST SERPL-MCNC: 87 MG/DL (ref 65–95)
GLUCOSE, 3 HOUR: 91 MG/DL (ref 65–140)

## 2024-04-11 ENCOUNTER — ROUTINE PRENATAL (OUTPATIENT)
Dept: OBGYN CLINIC | Age: 24
End: 2024-04-11

## 2024-04-11 ENCOUNTER — PROCEDURE VISIT (OUTPATIENT)
Dept: OBGYN CLINIC | Age: 24
End: 2024-04-11
Payer: COMMERCIAL

## 2024-04-11 VITALS
BODY MASS INDEX: 41.03 KG/M2 | HEIGHT: 60 IN | DIASTOLIC BLOOD PRESSURE: 60 MMHG | SYSTOLIC BLOOD PRESSURE: 128 MMHG | WEIGHT: 209 LBS

## 2024-04-11 DIAGNOSIS — O98.513 HERPES VIRUS INFECTION IN MOTHER DURING THIRD TRIMESTER OF PREGNANCY: ICD-10-CM

## 2024-04-11 DIAGNOSIS — J45.909 ASTHMA AFFECTING PREGNANCY, ANTEPARTUM: Primary | ICD-10-CM

## 2024-04-11 DIAGNOSIS — O99.810 ABNORMAL GLUCOSE AFFECTING PREGNANCY: ICD-10-CM

## 2024-04-11 DIAGNOSIS — Z34.83 MULTIGRAVIDA IN THIRD TRIMESTER: ICD-10-CM

## 2024-04-11 DIAGNOSIS — B00.9 HERPES VIRUS INFECTION IN MOTHER DURING THIRD TRIMESTER OF PREGNANCY: ICD-10-CM

## 2024-04-11 DIAGNOSIS — O34.219 PREVIOUS CESAREAN DELIVERY, ANTEPARTUM: ICD-10-CM

## 2024-04-11 DIAGNOSIS — O09.299 HISTORY OF PRIOR PREGNANCY WITH SHORT CERVIX, CURRENTLY PREGNANT: ICD-10-CM

## 2024-04-11 DIAGNOSIS — O09.299 HISTORY OF PRIOR PREGNANCY WITH SHORT CERVIX, CURRENTLY PREGNANT: Primary | ICD-10-CM

## 2024-04-11 DIAGNOSIS — O99.519 ASTHMA AFFECTING PREGNANCY, ANTEPARTUM: Primary | ICD-10-CM

## 2024-04-11 PROBLEM — O46.90 VAGINAL BLEEDING DURING PREGNANCY: Status: RESOLVED | Noted: 2024-03-19 | Resolved: 2024-04-11

## 2024-04-11 PROCEDURE — 76816 OB US FOLLOW-UP PER FETUS: CPT | Performed by: OBSTETRICS & GYNECOLOGY

## 2024-04-11 PROCEDURE — 76817 TRANSVAGINAL US OBSTETRIC: CPT | Performed by: OBSTETRICS & GYNECOLOGY

## 2024-04-11 PROCEDURE — 0502F SUBSEQUENT PRENATAL CARE: CPT | Performed by: OBSTETRICS & GYNECOLOGY

## 2024-04-11 NOTE — PROGRESS NOTES
Patient comes in today for routine prenatal visit. Patient states she took a fall yesterday and landed on her butt.     Fetal Movement: Yes  Contractions: No  Vaginal Bleeding: No  Leaking Fluid: No  GI/: No    Vitals:    04/11/24 1438   BP: 128/60   Site: Right Upper Arm   Position: Sitting   Weight: 94.8 kg (209 lb)   Height: 1.524 m (5')

## 2024-04-11 NOTE — PROGRESS NOTES
Chief Complaint   Patient presents with    Routine Prenatal Visit    Pregnancy Ultrasound        This 23 y.o.  at 31w1d with Estimated Date of Delivery: 24 presents for routine prenatal visit. Patient has no complaints today. Pt reports good FM, no LOF, VB, ctx. Pt denies H/A, vision changes, abdom pain, N/V.    Vitals:    24 1438   BP: 128/60   Site: Right Upper Arm   Position: Sitting   Weight: 94.8 kg (209 lb)   Height: 1.524 m (5')        Patient Active Problem List    Diagnosis Date Noted    Abnormal glucose affecting pregnancy 2024     Overview Note:     Failed 1 hr glucola, 3 hr GTT (PASSED)    24:  EFW 40%, AC 60%, OTILIA 18.4 cm, CL 3.89 cm, BREECH       Assessment & Plan Note:     noted      Multigravida in third trimester 10/19/2023     Overview Note:     EDC by 6 17 week US not C/W LMP    2019: CF/SMA neg  23:  NIPT low risk, FEMALE  23:  AFP only neg  3/19/24: declines tdap  24:  EFW 40%, AC 60%, OTILIA 18.4 cm, CL 3.89 cm, BREECH       Assessment & Plan Note:     Educated patient of signs and symptoms of  labor including but not limited to regular uterine contractions every 5-7 minutes for 1 hour, vaginal bleeding or leakage of fluid to seek immediate care.       Previous  delivery, antepartum 10/19/2023     Overview Note:     C/S x 1 (HSV outbreak)  D/W pt risks of , including but not limited to: risk of uterine rupture during labor of approximately 1% with a subsequent 40-50% risk of  neurological injury and or death.  D/W pt operative risks of repeat C/S including but not limited to death, bleeding, infection, damage to other internal organs and other risks involving future pregnancies and mode of delivery.  Also, D/W pt increase in maternal and fetal morbidity after failed SABAS.     Pt desires TOLAC       Assessment & Plan Note:     noted      Herpes virus infection in mother during third trimester of pregnancy 10/19/2023

## 2024-04-26 ENCOUNTER — ROUTINE PRENATAL (OUTPATIENT)
Dept: OBGYN CLINIC | Age: 24
End: 2024-04-26

## 2024-04-26 VITALS
SYSTOLIC BLOOD PRESSURE: 118 MMHG | WEIGHT: 212.4 LBS | DIASTOLIC BLOOD PRESSURE: 74 MMHG | HEIGHT: 60 IN | BODY MASS INDEX: 41.7 KG/M2

## 2024-04-26 DIAGNOSIS — J45.909 ASTHMA AFFECTING PREGNANCY, ANTEPARTUM: Primary | ICD-10-CM

## 2024-04-26 DIAGNOSIS — O99.810 ABNORMAL GLUCOSE AFFECTING PREGNANCY: ICD-10-CM

## 2024-04-26 DIAGNOSIS — O98.513 HERPES VIRUS INFECTION IN MOTHER DURING THIRD TRIMESTER OF PREGNANCY: ICD-10-CM

## 2024-04-26 DIAGNOSIS — Z34.83 MULTIGRAVIDA IN THIRD TRIMESTER: ICD-10-CM

## 2024-04-26 DIAGNOSIS — O34.219 PREVIOUS CESAREAN DELIVERY, ANTEPARTUM: ICD-10-CM

## 2024-04-26 DIAGNOSIS — B00.9 HERPES VIRUS INFECTION IN MOTHER DURING THIRD TRIMESTER OF PREGNANCY: ICD-10-CM

## 2024-04-26 DIAGNOSIS — O99.519 ASTHMA AFFECTING PREGNANCY, ANTEPARTUM: Primary | ICD-10-CM

## 2024-04-26 DIAGNOSIS — O09.299 HISTORY OF PRIOR PREGNANCY WITH SHORT CERVIX, CURRENTLY PREGNANT: ICD-10-CM

## 2024-04-26 PROCEDURE — 0502F SUBSEQUENT PRENATAL CARE: CPT | Performed by: NURSE PRACTITIONER

## 2024-04-26 NOTE — ASSESSMENT & PLAN NOTE
PTL/labor precautions, FMC, and pregnancy warning signs reviewed. Pt advised to call the office at [phone] or go straight to Labor and Delivery at [facility] with any of the following concerns vaginal bleeding, leaking of fluid, neal regularly Q 5-7 minutes for over an hour or not feeling the baby move.   RTO 2 weeks OBV/US

## 2024-04-26 NOTE — PROGRESS NOTES
Patient comes in today for routine prenatal visit.   Patient complains of difficulty sleeping, reviewed recommendations per PN booklet.     Fetal Movement: Yes  Contractions: No  Vaginal Bleeding: No  Leaking Fluid: No  GI/: No    Vitals:    04/26/24 0852   BP: 118/74   Site: Right Upper Arm   Position: Sitting   Weight: 96.3 kg (212 lb 6.4 oz)   Height: 1.524 m (5')

## 2024-04-26 NOTE — PROGRESS NOTES
I have reviewed the patient's visit today including history, exam and assessment by MANNIE Mehta.  I agree with treatment/plan as above.    Lamberto Ace MD  9:15 AM  04/26/24

## 2024-04-26 NOTE — PROGRESS NOTES
This is a 23 y.o.   at 33w2d for routine OB visit.    Her Estimated Due Date is 2024, by Ultrasound    Denies leaking of fluid, vaginal bleeding, or regular contractions. Reports fetal movement.     Current Outpatient Medications on File Prior to Visit   Medication Sig Dispense Refill    sodium chloride nebulizer 0.9 % solution       fluticasone (FLONASE) 50 MCG/ACT nasal spray 1 spray in each nostril Nasally twice a day for 5 days      Prenatal Vit w/Ns-Daatsjfic-LR (PNV PO) Take by mouth      albuterol sulfate HFA (PROVENTIL;VENTOLIN;PROAIR) 108 (90 Base) MCG/ACT inhaler Inhale 1-2 puffs into the lungs every 4 hours as needed for Shortness of Breath or Wheezing 18 g 3    valACYclovir (VALTREX) 500 MG tablet Take 1 tablet by mouth 2 times daily 60 tablet 6     No current facility-administered medications on file prior to visit.       No Known Allergies    OB History    Para Term  AB Living   4 1 1 0 2 1   SAB IAB Ectopic Molar Multiple Live Births   2 0 0 0 0 1       # 1 - Date: 19, Sex: Male, Weight: 3.465 kg (7 lb 10.2 oz), GA: 39w1d, Delivery: , Low Transverse, Apgar1: 8, Apgar5: 9, Living: Living, Birth Comments: None    # 2 - Date: 2023, Sex: None, Weight: None, GA: None, Delivery: None, Apgar1: None, Apgar5: None, Living: None, Birth Comments: None    # 3 - Date: 2023, Sex: None, Weight: None, GA: None, Delivery: None, Apgar1: None, Apgar5: None, Living: None, Birth Comments: None    # 4 - Date: None, Sex: None, Weight: None, GA: None, Delivery: None, Apgar1: None, Apgar5: None, Living: None, Birth Comments: None        Past Medical History:   Diagnosis Date    Asthma     Eczema     Herpes gestationis     last outbreak was 2 weeks ago    Herpes simplex virus (HSV) infection     Herpes simplex vulvovaginitis 2019    noted     HSV-2 seropositive     Severe obesity (HCC) 2019       Past Surgical History:   Procedure Laterality Date     SECTION

## 2024-05-10 ENCOUNTER — PROCEDURE VISIT (OUTPATIENT)
Dept: OBGYN CLINIC | Age: 24
End: 2024-05-10

## 2024-05-10 ENCOUNTER — ROUTINE PRENATAL (OUTPATIENT)
Dept: OBGYN CLINIC | Age: 24
End: 2024-05-10

## 2024-05-10 VITALS
DIASTOLIC BLOOD PRESSURE: 72 MMHG | SYSTOLIC BLOOD PRESSURE: 116 MMHG | WEIGHT: 216 LBS | BODY MASS INDEX: 42.41 KG/M2 | HEIGHT: 60 IN

## 2024-05-10 DIAGNOSIS — J45.909 ASTHMA AFFECTING PREGNANCY, ANTEPARTUM: Primary | ICD-10-CM

## 2024-05-10 DIAGNOSIS — O40.3XX0 POLYHYDRAMNIOS IN THIRD TRIMESTER COMPLICATION, SINGLE OR UNSPECIFIED FETUS: ICD-10-CM

## 2024-05-10 DIAGNOSIS — Z34.83 MULTIGRAVIDA IN THIRD TRIMESTER: ICD-10-CM

## 2024-05-10 DIAGNOSIS — O40.3XX0 POLYHYDRAMNIOS AFFECTING PREGNANCY IN THIRD TRIMESTER: Primary | ICD-10-CM

## 2024-05-10 DIAGNOSIS — O34.219 PREVIOUS CESAREAN DELIVERY, ANTEPARTUM: ICD-10-CM

## 2024-05-10 DIAGNOSIS — O99.810 ABNORMAL GLUCOSE AFFECTING PREGNANCY: ICD-10-CM

## 2024-05-10 DIAGNOSIS — O99.519 ASTHMA AFFECTING PREGNANCY, ANTEPARTUM: Primary | ICD-10-CM

## 2024-05-10 DIAGNOSIS — O98.513 HERPES VIRUS INFECTION IN MOTHER DURING THIRD TRIMESTER OF PREGNANCY: ICD-10-CM

## 2024-05-10 DIAGNOSIS — B00.9 HERPES VIRUS INFECTION IN MOTHER DURING THIRD TRIMESTER OF PREGNANCY: ICD-10-CM

## 2024-05-10 DIAGNOSIS — O26.843 SIGNIFICANT DISCREPANCY BETWEEN UTERINE SIZE AND CLINICAL DATES, ANTEPARTUM, THIRD TRIMESTER: ICD-10-CM

## 2024-05-10 DIAGNOSIS — O09.299 HISTORY OF PRIOR PREGNANCY WITH SHORT CERVIX, CURRENTLY PREGNANT: ICD-10-CM

## 2024-05-10 PROCEDURE — 0502F SUBSEQUENT PRENATAL CARE: CPT | Performed by: NURSE PRACTITIONER

## 2024-05-10 NOTE — ASSESSMENT & PLAN NOTE
PTL/labor precautions, FMC, and pregnancy warning signs reviewed. Pt advised to call the office at [phone] or go straight to Labor and Delivery at [facility] [place] with any of the following concerns vaginal bleeding, leaking of fluid, neal regularly Q 5-7 minutes for over an hour or not feeling the baby move.   RTO weekly OBV/US

## 2024-05-10 NOTE — PROGRESS NOTES
This is a 23 y.o.   at 35w2d for routine OB visit.    Her Estimated Due Date is 2024, by Ultrasound    Denies leaking of fluid, vaginal bleeding, or regular contractions. Reports fetal movement.     Current Outpatient Medications on File Prior to Visit   Medication Sig Dispense Refill    sodium chloride nebulizer 0.9 % solution       fluticasone (FLONASE) 50 MCG/ACT nasal spray 1 spray in each nostril Nasally twice a day for 5 days      Prenatal Vit w/Jx-Pxvwewolz-UD (PNV PO) Take by mouth      albuterol sulfate HFA (PROVENTIL;VENTOLIN;PROAIR) 108 (90 Base) MCG/ACT inhaler Inhale 1-2 puffs into the lungs every 4 hours as needed for Shortness of Breath or Wheezing 18 g 3    valACYclovir (VALTREX) 500 MG tablet Take 1 tablet by mouth 2 times daily 60 tablet 6     No current facility-administered medications on file prior to visit.       No Known Allergies    OB History    Para Term  AB Living   4 1 1 0 2 1   SAB IAB Ectopic Molar Multiple Live Births   2 0 0 0 0 1       # 1 - Date: 19, Sex: Male, Weight: 3.465 kg (7 lb 10.2 oz), GA: 39w1d, Delivery: , Low Transverse, Apgar1: 8, Apgar5: 9, Living: Living, Birth Comments: None    # 2 - Date: 2023, Sex: None, Weight: None, GA: None, Delivery: None, Apgar1: None, Apgar5: None, Living: None, Birth Comments: None    # 3 - Date: 2023, Sex: None, Weight: None, GA: None, Delivery: None, Apgar1: None, Apgar5: None, Living: None, Birth Comments: None    # 4 - Date: None, Sex: None, Weight: None, GA: None, Delivery: None, Apgar1: None, Apgar5: None, Living: None, Birth Comments: None        Past Medical History:   Diagnosis Date    Asthma     Eczema     Herpes gestationis     last outbreak was 2 weeks ago    Herpes simplex virus (HSV) infection     Herpes simplex vulvovaginitis 2019    noted     HSV-2 seropositive     Severe obesity (HCC) 2019       Past Surgical History:   Procedure Laterality Date     SECTION

## 2024-05-10 NOTE — PROGRESS NOTES
I have reviewed the patient's visit today including history, exam and assessment by MANNIE Mehta.  I agree with treatment/plan as above.    Lamberto Ace MD  9:41 AM  05/10/24

## 2024-05-10 NOTE — PROGRESS NOTES
Patient comes in today for routine prenatal visit. No complaints/concerns today.     Fetal Movement: Yes  Contractions: Yes BH  Vaginal Bleeding: No  Leaking Fluid: No  GI/: No    Vitals:    05/10/24 0816   BP: 116/72   Site: Left Upper Arm   Position: Sitting   Weight: 98 kg (216 lb)   Height: 1.524 m (5')

## 2024-05-16 ENCOUNTER — ROUTINE PRENATAL (OUTPATIENT)
Dept: OBGYN CLINIC | Age: 24
End: 2024-05-16

## 2024-05-16 ENCOUNTER — PROCEDURE VISIT (OUTPATIENT)
Dept: OBGYN CLINIC | Age: 24
End: 2024-05-16
Payer: COMMERCIAL

## 2024-05-16 VITALS
HEIGHT: 60 IN | DIASTOLIC BLOOD PRESSURE: 64 MMHG | WEIGHT: 217 LBS | SYSTOLIC BLOOD PRESSURE: 106 MMHG | BODY MASS INDEX: 42.6 KG/M2

## 2024-05-16 DIAGNOSIS — Z34.83 MULTIGRAVIDA IN THIRD TRIMESTER: Primary | ICD-10-CM

## 2024-05-16 DIAGNOSIS — O26.843 SIGNIFICANT DISCREPANCY BETWEEN UTERINE SIZE AND CLINICAL DATES, ANTEPARTUM, THIRD TRIMESTER: ICD-10-CM

## 2024-05-16 DIAGNOSIS — O40.3XX0 POLYHYDRAMNIOS IN THIRD TRIMESTER COMPLICATION, SINGLE OR UNSPECIFIED FETUS: Primary | ICD-10-CM

## 2024-05-16 DIAGNOSIS — O40.3XX0 POLYHYDRAMNIOS IN THIRD TRIMESTER COMPLICATION, SINGLE OR UNSPECIFIED FETUS: ICD-10-CM

## 2024-05-16 DIAGNOSIS — O99.810 ABNORMAL GLUCOSE AFFECTING PREGNANCY: ICD-10-CM

## 2024-05-16 DIAGNOSIS — O09.299 HISTORY OF PRIOR PREGNANCY WITH SHORT CERVIX, CURRENTLY PREGNANT: ICD-10-CM

## 2024-05-16 DIAGNOSIS — O98.513 HERPES VIRUS INFECTION IN MOTHER DURING THIRD TRIMESTER OF PREGNANCY: ICD-10-CM

## 2024-05-16 DIAGNOSIS — Z34.83 MULTIGRAVIDA IN THIRD TRIMESTER: ICD-10-CM

## 2024-05-16 DIAGNOSIS — J45.909 ASTHMA AFFECTING PREGNANCY, ANTEPARTUM: ICD-10-CM

## 2024-05-16 DIAGNOSIS — B00.9 HERPES VIRUS INFECTION IN MOTHER DURING THIRD TRIMESTER OF PREGNANCY: ICD-10-CM

## 2024-05-16 DIAGNOSIS — O99.519 ASTHMA AFFECTING PREGNANCY, ANTEPARTUM: ICD-10-CM

## 2024-05-16 DIAGNOSIS — O34.219 PREVIOUS CESAREAN DELIVERY, ANTEPARTUM: ICD-10-CM

## 2024-05-16 PROCEDURE — 0502F SUBSEQUENT PRENATAL CARE: CPT | Performed by: NURSE PRACTITIONER

## 2024-05-16 PROCEDURE — 76819 FETAL BIOPHYS PROFIL W/O NST: CPT | Performed by: OBSTETRICS & GYNECOLOGY

## 2024-05-16 NOTE — PROGRESS NOTES
I have reviewed the patient's visit today including history, exam and assessment by MANNIE Mehta.  I agree with treatment/plan as above.    Lamberto Ace MD  11:47 AM  05/16/24

## 2024-05-16 NOTE — PROGRESS NOTES
Patient comes in today for routine prenatal visit.  Patient is concerned \"spot\" on left labial area that appeared last week. Patient itching, burning, or discharge from the \"spot.\"     Fetal Movement: Yes  Contractions: No  Vaginal Bleeding: No  Leaking Fluid: No  GI/: No    Vitals:    05/16/24 1102   BP: 106/64   Site: Right Upper Arm   Position: Sitting   Weight: 98.4 kg (217 lb)   Height: 1.524 m (5')

## 2024-05-16 NOTE — ASSESSMENT & PLAN NOTE
PTL/labor precautions, FMC, and pregnancy warning signs reviewed. Pt advised to call the office at [phone] or go straight to Labor and Delivery at [facility] with any of the following concerns vaginal bleeding, leaking of fluid, neal regularly Q 5-7 minutes for over an hour or not feeling the baby move.   Rto 1 wk

## 2024-05-16 NOTE — PROGRESS NOTES
This is a 23 y.o.   at 36w1d for routine OB visit.    Her Estimated Due Date is 2024, by Ultrasound    Denies leaking of fluid, vaginal bleeding, or regular contractions. Reports fetal movement.     Patient is concerned \"spot\" on left labial area that appeared last week. ?friction. Denies normal prodromal symptoms like an outbreak. States it was more irritation    Current Outpatient Medications on File Prior to Visit   Medication Sig Dispense Refill    sodium chloride nebulizer 0.9 % solution       fluticasone (FLONASE) 50 MCG/ACT nasal spray 1 spray in each nostril Nasally twice a day for 5 days      Prenatal Vit w/Wa-Mayofevqq-KW (PNV PO) Take by mouth      albuterol sulfate HFA (PROVENTIL;VENTOLIN;PROAIR) 108 (90 Base) MCG/ACT inhaler Inhale 1-2 puffs into the lungs every 4 hours as needed for Shortness of Breath or Wheezing 18 g 3    valACYclovir (VALTREX) 500 MG tablet Take 1 tablet by mouth 2 times daily 60 tablet 6     No current facility-administered medications on file prior to visit.       No Known Allergies    OB History    Para Term  AB Living   4 1 1 0 2 1   SAB IAB Ectopic Molar Multiple Live Births   2 0 0 0 0 1       # 1 - Date: 19, Sex: Male, Weight: 3.465 kg (7 lb 10.2 oz), GA: 39w1d, Delivery: , Low Transverse, Apgar1: 8, Apgar5: 9, Living: Living, Birth Comments: None    # 2 - Date: 2023, Sex: None, Weight: None, GA: None, Delivery: None, Apgar1: None, Apgar5: None, Living: None, Birth Comments: None    # 3 - Date: 2023, Sex: None, Weight: None, GA: None, Delivery: None, Apgar1: None, Apgar5: None, Living: None, Birth Comments: None    # 4 - Date: None, Sex: None, Weight: None, GA: None, Delivery: None, Apgar1: None, Apgar5: None, Living: None, Birth Comments: None        Past Medical History:   Diagnosis Date    Asthma     Eczema     Herpes gestationis     last outbreak was 2 weeks ago    Herpes simplex virus (HSV) infection     Herpes simplex

## 2024-05-16 NOTE — ASSESSMENT & PLAN NOTE
noted   • This problem is chronic, likely secondary to past trauma and mood disorder. Worsened by stressful work environment and most recently menstrual cycle. There is an associated musculoskeletal component with somatic dysfunction specifically affecting the above noted regions.  • OMT was performed in order to increase ROM, influence parasympathetic/sympathetic tone and decrease pain.  • The treatment was tolerated well without adverse events with subjective improvement in symptoms.  • Recommendations include continuing exercise and stretching, adequate hydration, continue duloxetine and return in 3-4 weeks for reassessment.

## 2024-05-17 ENCOUNTER — HOSPITAL ENCOUNTER (OUTPATIENT)
Age: 24
Discharge: HOME | End: 2024-05-17
Attending: OBSTETRICS & GYNECOLOGY | Admitting: OBSTETRICS & GYNECOLOGY
Payer: COMMERCIAL

## 2024-05-17 VITALS
DIASTOLIC BLOOD PRESSURE: 73 MMHG | RESPIRATION RATE: 17 BRPM | TEMPERATURE: 98.2 F | HEART RATE: 89 BPM | SYSTOLIC BLOOD PRESSURE: 123 MMHG

## 2024-05-17 PROBLEM — O42.90 AMNIOTIC FLUID LEAKING: Status: ACTIVE | Noted: 2024-05-17

## 2024-05-17 PROBLEM — Z03.71 ENCOUNTER FOR SUSPECTED PREMATURE RUPTURE OF MEMBRANES, WITH RUPTURE OF MEMBRANES NOT FOUND: Status: ACTIVE | Noted: 2024-05-17

## 2024-05-17 PROBLEM — Z3A.36 36 WEEKS GESTATION OF PREGNANCY: Status: ACTIVE | Noted: 2024-05-17

## 2024-05-17 LAB
AMNISURE, POC: NEGATIVE
Lab: NORMAL
NEGATIVE QC PASS/FAIL: NORMAL
POSITIVE QC PASS/FAIL: NORMAL

## 2024-05-17 PROCEDURE — 99282 EMERGENCY DEPT VISIT SF MDM: CPT

## 2024-05-17 ASSESSMENT — ENCOUNTER SYMPTOMS
BACK PAIN: 0
NAUSEA: 0

## 2024-05-17 NOTE — PROGRESS NOTES
Reactive NST obtained, D/C instructions gone over with pt to return with big gush of fluid and VB or regular UC q 3-5 minutes x 2 hour. Pt verbalized understanding. Pt left unit ambulatory without further complaints

## 2024-05-17 NOTE — H&P
HISTORY AND PHYSICAL             Date: 2024        Patient Name: Vinita Lyon     YOB: 2000      Age:  23 y.o.    Chief Complaint         Watery discharge    History Obtained From   patient    History of Present Illness   patient is a 27 yo  with NAOMIE 2024 at  36 weeks 2 days by US dating at 6 weeks who presents with LOF    Onset: today  Quality:  3 episodes of sensation of wetness/fluid running down leg  Severity:  denies pain  Associated: denies contractions, no VB, reports good fetal movement, no vaginal itching or irritation, no odor    Prenatal Care: [facility] - prenatal records reviewed, pregnancy complicated by prior  section, polyhydramnios      Past Medical History     Past Medical History:   Diagnosis Date    Asthma     Eczema     Herpes gestationis     last outbreak was 2 weeks ago    Herpes simplex virus (HSV) infection     Herpes simplex vulvovaginitis 2019    noted     HSV-2 seropositive     Severe obesity (HCC) 2019        Past Surgical History     Past Surgical History:   Procedure Laterality Date     SECTION      x1        Medications Prior to Admission     Prior to Admission medications    Medication Sig Start Date End Date Taking? Authorizing Provider   sodium chloride nebulizer 0.9 % solution  24   ProviderAnnie MD   fluticasone (FLONASE) 50 MCG/ACT nasal spray 1 spray in each nostril Nasally twice a day for 5 days    ProviderAnnie MD   Prenatal Vit w/Dy-Oiifjyrvv-LE (PNV PO) Take by mouth    Annie Granda MD   albuterol sulfate HFA (PROVENTIL;VENTOLIN;PROAIR) 108 (90 Base) MCG/ACT inhaler Inhale 1-2 puffs into the lungs every 4 hours as needed for Shortness of Breath or Wheezing 10/19/23   Eli Davis APRN - CNP   valACYclovir (VALTREX) 500 MG tablet Take 1 tablet by mouth 2 times daily 10/19/23   Eli Davis APRN - CNP        Allergies   Patient has no known allergies.    Social History      Social History       Tobacco History       Smoking Status  Never      Smokeless Tobacco Use  Never              Alcohol History       Alcohol Use Status  No              Drug Use       Drug Use Status  Never              Sexual Activity       Sexually Active  Yes Partners  Male                    Family History     Family History   Problem Relation Age of Onset    No Known Problems Father     Asthma Mother     Breast Cancer Neg Hx     Uterine Cancer Neg Hx     Ovarian Cancer Neg Hx     Colon Cancer Neg Hx        Review of Systems   Review of Systems   Constitutional:  Negative for chills and fever.   Cardiovascular:  Positive for leg swelling.   Gastrointestinal:  Negative for abdominal pain and nausea.   Genitourinary:  Positive for vaginal discharge. Negative for vaginal bleeding.   Musculoskeletal:  Negative for back pain.   Neurological:  Negative for headaches.       Physical Exam   /73   Pulse 89   Temp 98.2 °F (36.8 °C) (Oral)   Resp 17   LMP 08/30/2023     Physical Exam  Vitals and nursing note reviewed. Exam conducted with a chaperone present.   Constitutional:       General: She is not in acute distress.     Appearance: Normal appearance.   Cardiovascular:      Rate and Rhythm: Normal rate.   Pulmonary:      Effort: Pulmonary effort is normal.   Abdominal:      Palpations: Abdomen is soft.      Tenderness: There is no abdominal tenderness. There is no guarding or rebound.      Comments: Gravid nontender   Skin:     General: Skin is warm and dry.   Neurological:      General: No focal deficit present.      Mental Status: She is alert and oriented to person, place, and time.   Psychiatric:         Mood and Affect: Mood normal.         Behavior: Behavior normal.         Thought Content: Thought content normal.         Judgment: Judgment normal.           Pelvic: per RN  Vulva: normal, no external lesions  Vagina:no significant discharge - Amnisure collected by RN with patient valsalva   SSE:

## 2024-05-17 NOTE — PROGRESS NOTES
Provider Testing:  nonstress test    Indications:  36.2 weeks,  LOF, suspected ROM not found    NST results::  Reactive    EFM:  baseline 150, accelerations present,  decelerations absent  Tocos:  q 8 minutes with 10-20 irritability in between, lasting   40 seconds, mild - patient unaware    Start: 18:27 pm  End:  18:50 pm    Category:  1    Reviewed & interpreted by myself,    Pam Rascon MD  OB Hospitalist

## 2024-05-17 NOTE — PROGRESS NOTES
Pt to triage with c/o of leaking fluid 3 different times today. Pt denies having a gush of fluid. Denies VB.+FM per pt. Monitors applied. Amniosure running

## 2024-05-18 LAB
BACTERIA SPEC CULT: ABNORMAL
SERVICE CMNT-IMP: ABNORMAL

## 2024-05-19 PROBLEM — O99.820 GBS (GROUP B STREPTOCOCCUS CARRIER), +RV CULTURE, CURRENTLY PREGNANT: Status: ACTIVE | Noted: 2024-05-19

## 2024-05-19 LAB
C TRACH RRNA SPEC QL NAA+PROBE: NEGATIVE
N GONORRHOEA RRNA SPEC QL NAA+PROBE: NEGATIVE
SPECIMEN SOURCE: NORMAL

## 2024-05-23 ENCOUNTER — PROCEDURE VISIT (OUTPATIENT)
Dept: OBGYN CLINIC | Age: 24
End: 2024-05-23
Payer: COMMERCIAL

## 2024-05-23 ENCOUNTER — ROUTINE PRENATAL (OUTPATIENT)
Dept: OBGYN CLINIC | Age: 24
End: 2024-05-23

## 2024-05-23 VITALS
HEIGHT: 60 IN | SYSTOLIC BLOOD PRESSURE: 110 MMHG | DIASTOLIC BLOOD PRESSURE: 62 MMHG | WEIGHT: 217 LBS | BODY MASS INDEX: 42.6 KG/M2

## 2024-05-23 DIAGNOSIS — O99.519 ASTHMA AFFECTING PREGNANCY, ANTEPARTUM: Primary | ICD-10-CM

## 2024-05-23 DIAGNOSIS — B00.9 HERPES VIRUS INFECTION IN MOTHER DURING THIRD TRIMESTER OF PREGNANCY: ICD-10-CM

## 2024-05-23 DIAGNOSIS — O40.3XX0 POLYHYDRAMNIOS IN THIRD TRIMESTER COMPLICATION, SINGLE OR UNSPECIFIED FETUS: Primary | ICD-10-CM

## 2024-05-23 DIAGNOSIS — O40.3XX0 POLYHYDRAMNIOS IN THIRD TRIMESTER COMPLICATION, SINGLE OR UNSPECIFIED FETUS: ICD-10-CM

## 2024-05-23 DIAGNOSIS — O34.219 PREVIOUS CESAREAN DELIVERY, ANTEPARTUM: ICD-10-CM

## 2024-05-23 DIAGNOSIS — O99.810 ABNORMAL GLUCOSE AFFECTING PREGNANCY: ICD-10-CM

## 2024-05-23 DIAGNOSIS — J45.909 ASTHMA AFFECTING PREGNANCY, ANTEPARTUM: Primary | ICD-10-CM

## 2024-05-23 DIAGNOSIS — Z34.83 MULTIGRAVIDA IN THIRD TRIMESTER: ICD-10-CM

## 2024-05-23 DIAGNOSIS — O99.820 GBS (GROUP B STREPTOCOCCUS CARRIER), +RV CULTURE, CURRENTLY PREGNANT: ICD-10-CM

## 2024-05-23 DIAGNOSIS — O98.513 HERPES VIRUS INFECTION IN MOTHER DURING THIRD TRIMESTER OF PREGNANCY: ICD-10-CM

## 2024-05-23 DIAGNOSIS — O09.299 HISTORY OF PRIOR PREGNANCY WITH SHORT CERVIX, CURRENTLY PREGNANT: ICD-10-CM

## 2024-05-23 PROBLEM — Z3A.36 36 WEEKS GESTATION OF PREGNANCY: Status: RESOLVED | Noted: 2024-05-17 | Resolved: 2024-05-23

## 2024-05-23 PROBLEM — O42.90 AMNIOTIC FLUID LEAKING: Status: RESOLVED | Noted: 2024-05-17 | Resolved: 2024-05-23

## 2024-05-23 PROBLEM — Z03.71 ENCOUNTER FOR SUSPECTED PREMATURE RUPTURE OF MEMBRANES, WITH RUPTURE OF MEMBRANES NOT FOUND: Status: RESOLVED | Noted: 2024-05-17 | Resolved: 2024-05-23

## 2024-05-23 PROCEDURE — 76819 FETAL BIOPHYS PROFIL W/O NST: CPT | Performed by: OBSTETRICS & GYNECOLOGY

## 2024-05-23 PROCEDURE — 0502F SUBSEQUENT PRENATAL CARE: CPT | Performed by: OBSTETRICS & GYNECOLOGY

## 2024-05-23 NOTE — PROGRESS NOTES
Patient comes in today for routine prenatal visit. No complaints/concerns today.     Fetal Movement: Yes  Contractions: Yes-BH  Vaginal Bleeding: No  Leaking Fluid: No  GI/: No    Vitals:    05/23/24 1320   BP: 110/62   Site: Right Upper Arm   Position: Sitting   Weight: 98.4 kg (217 lb)   Height: 1.524 m (5')

## 2024-05-23 NOTE — PROGRESS NOTES
Chief Complaint   Patient presents with    Routine Prenatal Visit    Ultrasound        This 23 y.o.  at 37w1d with Estimated Date of Delivery: 24 presents for routine prenatal visit. Patient has no complaints today. Pt reports good FM, no LOF, VB, ctx. Pt denies H/A, vision changes, abdom pain, N/V.    Vitals:    24 1320   BP: 110/62   Site: Right Upper Arm   Position: Sitting   Weight: 98.4 kg (217 lb)   Height: 1.524 m (5')        Patient Active Problem List    Diagnosis Date Noted    GBS (group B Streptococcus carrier), +RV culture, currently pregnant 2024     Overview Note:     Treat in labor       Assessment & Plan Note:     noted      Polyhydramnios in third trimester 05/10/2024     Overview Note:     5/10/24: EFW 65%, AC 73%, OTILIA increased 24 cm, MVP 6.6, vertex, BPP 8/8    24: OTILIA 24 cm, BPP 8/8, vertex  24:  OTILIA 24.3 cm, BPP 8/8, vtx       Assessment & Plan Note:     noted      Abnormal glucose affecting pregnancy 2024     Overview Note:     Failed 1 hr glucola, 3 hr GTT (PASSED)    24:  EFW 40%, AC 60%, OTILIA 18.4 cm, CL 3.89 cm, BREECH  5/10/24: EFW 65%, AC 73%, OTILIA increased 24 cm, MVP 6.6, vertex       Assessment & Plan Note:     noted      Multigravida in third trimester 10/19/2023     Overview Note:     EDC by 6 1/7 week US not C/W LMP    2019: CF/SMA neg  23:  NIPT low risk, FEMALE  23:  AFP only neg  3/19/24: declines tdap  24:  EFW 40%, AC 60%, OTILIA 18.4 cm, CL 3.89 cm, BREECH  5/10/24: EFW 65%, AC 73%, OTILIA increased 24 cm, MVP 6.6, vertex       Assessment & Plan Note:     Educated patient of signs and symptoms of  labor including but not limited to regular uterine contractions every 5-7 minutes for 1 hour, vaginal bleeding or leakage of fluid to seek immediate care.       Previous  delivery, antepartum 10/19/2023     Overview Note:     C/S x 1 (HSV outbreak)  D/W pt risks of , including but not limited to: risk of

## 2024-05-30 ENCOUNTER — PROCEDURE VISIT (OUTPATIENT)
Dept: OBGYN CLINIC | Age: 24
End: 2024-05-30
Payer: COMMERCIAL

## 2024-05-30 ENCOUNTER — ROUTINE PRENATAL (OUTPATIENT)
Dept: OBGYN CLINIC | Age: 24
End: 2024-05-30

## 2024-05-30 VITALS
WEIGHT: 216 LBS | SYSTOLIC BLOOD PRESSURE: 116 MMHG | DIASTOLIC BLOOD PRESSURE: 74 MMHG | BODY MASS INDEX: 42.41 KG/M2 | HEIGHT: 60 IN

## 2024-05-30 DIAGNOSIS — O09.299 HISTORY OF PRIOR PREGNANCY WITH SHORT CERVIX, CURRENTLY PREGNANT: ICD-10-CM

## 2024-05-30 DIAGNOSIS — O99.810 ABNORMAL GLUCOSE AFFECTING PREGNANCY: ICD-10-CM

## 2024-05-30 DIAGNOSIS — B00.9 HERPES VIRUS INFECTION IN MOTHER DURING THIRD TRIMESTER OF PREGNANCY: ICD-10-CM

## 2024-05-30 DIAGNOSIS — J45.909 ASTHMA AFFECTING PREGNANCY, ANTEPARTUM: Primary | ICD-10-CM

## 2024-05-30 DIAGNOSIS — O99.519 ASTHMA AFFECTING PREGNANCY, ANTEPARTUM: Primary | ICD-10-CM

## 2024-05-30 DIAGNOSIS — Z34.83 MULTIGRAVIDA IN THIRD TRIMESTER: ICD-10-CM

## 2024-05-30 DIAGNOSIS — O40.3XX0 POLYHYDRAMNIOS IN THIRD TRIMESTER COMPLICATION, SINGLE OR UNSPECIFIED FETUS: ICD-10-CM

## 2024-05-30 DIAGNOSIS — O34.219 PREVIOUS CESAREAN DELIVERY, ANTEPARTUM: ICD-10-CM

## 2024-05-30 DIAGNOSIS — O99.820 GBS (GROUP B STREPTOCOCCUS CARRIER), +RV CULTURE, CURRENTLY PREGNANT: ICD-10-CM

## 2024-05-30 DIAGNOSIS — O98.513 HERPES VIRUS INFECTION IN MOTHER DURING THIRD TRIMESTER OF PREGNANCY: ICD-10-CM

## 2024-05-30 DIAGNOSIS — O40.3XX0 POLYHYDRAMNIOS IN THIRD TRIMESTER COMPLICATION, SINGLE OR UNSPECIFIED FETUS: Primary | ICD-10-CM

## 2024-05-30 PROCEDURE — 0502F SUBSEQUENT PRENATAL CARE: CPT | Performed by: OBSTETRICS & GYNECOLOGY

## 2024-05-30 PROCEDURE — 76819 FETAL BIOPHYS PROFIL W/O NST: CPT | Performed by: OBSTETRICS & GYNECOLOGY

## 2024-05-30 PROCEDURE — 76816 OB US FOLLOW-UP PER FETUS: CPT | Performed by: OBSTETRICS & GYNECOLOGY

## 2024-05-30 NOTE — ASSESSMENT & PLAN NOTE
Educated patient of signs and symptoms of preter labor including but not limited to regular uterine contractions every 5-7 minutes for 1 hour, vaginal bleeding or leakage of fluid to seek immediate care.

## 2024-05-30 NOTE — PROGRESS NOTES
Chaperone for Intimate Exam     Chaperone was offer accepted as part of the rooming process    Chaperone: Maryan Shepherd     
Patient comes in today for routine prenatal visit. No complaints/concerns today.     Fetal Movement: Yes  Contractions: Yes-BH   Vaginal Bleeding: No  Leaking Fluid: No  GI/: No    Vitals:    05/30/24 1111   BP: 116/74   Site: Left Upper Arm   Position: Sitting   Weight: 98 kg (216 lb)   Height: 1.524 m (5')        
nebulizer tx. We discuss close watch for asthma symptoms. Recommend starting daily zyrtec in prep for allergy season. Consider ICS if symptoms return       Assessment & Plan Note:     Stable currently        Problem List Items Addressed This Visit              Previous  delivery, antepartum     noted         Asthma affecting pregnancy, antepartum - Primary     Stable currently            Other    Polyhydramnios in third trimester     noted         Multigravida in third trimester     Educated patient of signs and symptoms of preter labor including but not limited to regular uterine contractions every 5-7 minutes for 1 hour, vaginal bleeding or leakage of fluid to seek immediate care.          History of prior pregnancy with short cervix, currently pregnant     noted         Herpes virus infection in mother during third trimester of pregnancy     noted         GBS (group B Streptococcus carrier), +RV culture, currently pregnant     noted         Abnormal glucose affecting pregnancy     noted             Lamberto Ace MD   11:24 AM  24

## 2024-06-06 ENCOUNTER — ROUTINE PRENATAL (OUTPATIENT)
Dept: OBGYN CLINIC | Age: 24
End: 2024-06-06

## 2024-06-06 ENCOUNTER — PROCEDURE VISIT (OUTPATIENT)
Dept: OBGYN CLINIC | Age: 24
End: 2024-06-06
Payer: COMMERCIAL

## 2024-06-06 ENCOUNTER — PREP FOR PROCEDURE (OUTPATIENT)
Dept: OBGYN CLINIC | Age: 24
End: 2024-06-06

## 2024-06-06 VITALS
HEIGHT: 60 IN | SYSTOLIC BLOOD PRESSURE: 114 MMHG | BODY MASS INDEX: 42.6 KG/M2 | DIASTOLIC BLOOD PRESSURE: 70 MMHG | WEIGHT: 217 LBS

## 2024-06-06 DIAGNOSIS — O34.219 PREVIOUS CESAREAN DELIVERY, ANTEPARTUM: ICD-10-CM

## 2024-06-06 DIAGNOSIS — O99.519 ASTHMA AFFECTING PREGNANCY, ANTEPARTUM: Primary | ICD-10-CM

## 2024-06-06 DIAGNOSIS — Z34.83 MULTIGRAVIDA IN THIRD TRIMESTER: ICD-10-CM

## 2024-06-06 DIAGNOSIS — J45.909 ASTHMA AFFECTING PREGNANCY, ANTEPARTUM: Primary | ICD-10-CM

## 2024-06-06 DIAGNOSIS — O99.820 GBS (GROUP B STREPTOCOCCUS CARRIER), +RV CULTURE, CURRENTLY PREGNANT: ICD-10-CM

## 2024-06-06 DIAGNOSIS — O99.810 ABNORMAL GLUCOSE AFFECTING PREGNANCY: ICD-10-CM

## 2024-06-06 DIAGNOSIS — B00.9 HERPES VIRUS INFECTION IN MOTHER DURING THIRD TRIMESTER OF PREGNANCY: ICD-10-CM

## 2024-06-06 DIAGNOSIS — O40.3XX0 POLYHYDRAMNIOS IN THIRD TRIMESTER COMPLICATION, SINGLE OR UNSPECIFIED FETUS: Primary | ICD-10-CM

## 2024-06-06 DIAGNOSIS — O40.3XX0 POLYHYDRAMNIOS IN THIRD TRIMESTER COMPLICATION, SINGLE OR UNSPECIFIED FETUS: ICD-10-CM

## 2024-06-06 DIAGNOSIS — O98.513 HERPES VIRUS INFECTION IN MOTHER DURING THIRD TRIMESTER OF PREGNANCY: ICD-10-CM

## 2024-06-06 DIAGNOSIS — O09.299 HISTORY OF PRIOR PREGNANCY WITH SHORT CERVIX, CURRENTLY PREGNANT: ICD-10-CM

## 2024-06-06 PROCEDURE — 0502F SUBSEQUENT PRENATAL CARE: CPT | Performed by: NURSE PRACTITIONER

## 2024-06-06 PROCEDURE — 76819 FETAL BIOPHYS PROFIL W/O NST: CPT | Performed by: OBSTETRICS & GYNECOLOGY

## 2024-06-06 RX ORDER — ONDANSETRON 2 MG/ML
4 INJECTION INTRAMUSCULAR; INTRAVENOUS EVERY 6 HOURS PRN
Status: CANCELLED | OUTPATIENT
Start: 2024-06-06

## 2024-06-06 RX ORDER — MISOPROSTOL 100 UG/1
400 TABLET ORAL PRN
Status: CANCELLED | OUTPATIENT
Start: 2024-06-06

## 2024-06-06 RX ORDER — SODIUM CHLORIDE, SODIUM LACTATE, POTASSIUM CHLORIDE, AND CALCIUM CHLORIDE .6; .31; .03; .02 G/100ML; G/100ML; G/100ML; G/100ML
1000 INJECTION, SOLUTION INTRAVENOUS PRN
Status: CANCELLED | OUTPATIENT
Start: 2024-06-06

## 2024-06-06 RX ORDER — METHYLERGONOVINE MALEATE 0.2 MG/ML
200 INJECTION INTRAVENOUS PRN
Status: CANCELLED | OUTPATIENT
Start: 2024-06-06

## 2024-06-06 RX ORDER — SODIUM CHLORIDE 0.9 % (FLUSH) 0.9 %
5-40 SYRINGE (ML) INJECTION EVERY 12 HOURS SCHEDULED
Status: CANCELLED | OUTPATIENT
Start: 2024-06-06

## 2024-06-06 RX ORDER — SODIUM CHLORIDE 9 MG/ML
INJECTION, SOLUTION INTRAVENOUS PRN
Status: CANCELLED | OUTPATIENT
Start: 2024-06-06

## 2024-06-06 RX ORDER — DEXTROSE, SODIUM CHLORIDE, SODIUM LACTATE, POTASSIUM CHLORIDE, AND CALCIUM CHLORIDE 5; .6; .31; .03; .02 G/100ML; G/100ML; G/100ML; G/100ML; G/100ML
INJECTION, SOLUTION INTRAVENOUS CONTINUOUS
Status: CANCELLED | OUTPATIENT
Start: 2024-06-06

## 2024-06-06 RX ORDER — SODIUM CHLORIDE 0.9 % (FLUSH) 0.9 %
5-40 SYRINGE (ML) INJECTION PRN
Status: CANCELLED | OUTPATIENT
Start: 2024-06-06

## 2024-06-06 RX ORDER — SODIUM CHLORIDE, SODIUM LACTATE, POTASSIUM CHLORIDE, AND CALCIUM CHLORIDE .6; .31; .03; .02 G/100ML; G/100ML; G/100ML; G/100ML
500 INJECTION, SOLUTION INTRAVENOUS PRN
Status: CANCELLED | OUTPATIENT
Start: 2024-06-06

## 2024-06-06 RX ORDER — TERBUTALINE SULFATE 1 MG/ML
0.25 INJECTION, SOLUTION SUBCUTANEOUS ONCE
Status: CANCELLED | OUTPATIENT
Start: 2024-06-06 | End: 2024-06-06

## 2024-06-06 RX ORDER — ONDANSETRON 4 MG/1
4 TABLET, ORALLY DISINTEGRATING ORAL EVERY 6 HOURS PRN
Status: CANCELLED | OUTPATIENT
Start: 2024-06-06

## 2024-06-06 NOTE — ASSESSMENT & PLAN NOTE
PTL/labor precautions, FMC, and pregnancy warning signs reviewed. Pt advised to call the office at [phone] or go straight to Labor and Delivery at [facility] with any of the following concerns vaginal bleeding, leaking of fluid, neal regularly Q 5-7 minutes for over an hour or not feeling the baby move.   Strict FMCs reinforced  Rto 6 wk PPV

## 2024-06-06 NOTE — ASSESSMENT & PLAN NOTE
D/W pt at length induction vs spontaneous labor risks and benefits including but not limited to: favorable cervix, Cano's score, elective vs medical induction, increased risk of longer latent stage, increased risk of FHT's abnormalities, tachysystole, increased risk of , placental abruption, uterine rupture, varying methods of cervical ripening and induction (cytotec, cervical balloon, cervidil and pitocin)  Pt understands, accepts all known and unknown risks and wishes to proceed.    We discuss cervical ripening in light of  is CONTRAINDICATED  D/W Dr Ace  In light of polyhydramnios, we discuss recommendation for delivery at 39 weeks to reduce stillbirth risk   IOL with AROM and low dose pit ok per Dr Ace with close monitoring  Pt would like to proceed with IOL next week if undelivered  Scheduled for 2024 (scheduled with deshawn 24 at 1005)

## 2024-06-06 NOTE — PROGRESS NOTES
I have reviewed the patient's visit today including history, exam and assessment by MANNIE Mehta.  I agree with treatment/plan as above.    Lamberto Ace MD  10:28 AM  06/06/24

## 2024-06-06 NOTE — PROGRESS NOTES
Patient comes in today for routine prenatal visit. No complaints/concerns today.     Fetal Movement: Yes  Contractions: Yes-had strong contractions yesterday. Denies contractions today.   Vaginal Bleeding: No  Leaking Fluid: No  GI/: No    Vitals:    06/06/24 0940   BP: 114/70   Site: Right Upper Arm   Position: Sitting   Weight: 98.4 kg (217 lb)   Height: 1.524 m (5')

## 2024-06-06 NOTE — PROGRESS NOTES
This is a 23 y.o.   at 39w1d for routine OB visit.    Her Estimated Due Date is 2024, by Ultrasound    Denies leaking of fluid, vaginal bleeding, or regular contractions. Reports fetal movement.     Current Outpatient Medications on File Prior to Visit   Medication Sig Dispense Refill    sodium chloride nebulizer 0.9 % solution       fluticasone (FLONASE) 50 MCG/ACT nasal spray 1 spray in each nostril Nasally twice a day for 5 days      Prenatal Vit w/Zo-Vvnskklmy-NL (PNV PO) Take by mouth      albuterol sulfate HFA (PROVENTIL;VENTOLIN;PROAIR) 108 (90 Base) MCG/ACT inhaler Inhale 1-2 puffs into the lungs every 4 hours as needed for Shortness of Breath or Wheezing 18 g 3    valACYclovir (VALTREX) 500 MG tablet Take 1 tablet by mouth 2 times daily 60 tablet 6     No current facility-administered medications on file prior to visit.       No Known Allergies    OB History    Para Term  AB Living   4 1 1 0 2 1   SAB IAB Ectopic Molar Multiple Live Births   2 0 0 0 0 1       # 1 - Date: 19, Sex: Male, Weight: 3.465 kg (7 lb 10.2 oz), GA: 39w1d, Delivery: , Low Transverse, Apgar1: 8, Apgar5: 9, Living: Living, Birth Comments: None    # 2 - Date: 2023, Sex: None, Weight: None, GA: None, Delivery: None, Apgar1: None, Apgar5: None, Living: None, Birth Comments: None    # 3 - Date: 2023, Sex: None, Weight: None, GA: None, Delivery: None, Apgar1: None, Apgar5: None, Living: None, Birth Comments: None    # 4 - Date: None, Sex: None, Weight: None, GA: None, Delivery: None, Apgar1: None, Apgar5: None, Living: None, Birth Comments: None        Past Medical History:   Diagnosis Date    Asthma     Eczema     Herpes gestationis     last outbreak was 2 weeks ago    Herpes simplex virus (HSV) infection     Herpes simplex vulvovaginitis 2019    noted     HSV-2 seropositive     Severe obesity (HCC) 2019       Past Surgical History:   Procedure Laterality Date     SECTION

## 2024-06-09 ENCOUNTER — HOSPITAL ENCOUNTER (INPATIENT)
Age: 24
LOS: 1 days | Discharge: HOME | End: 2024-06-10
Attending: OBSTETRICS & GYNECOLOGY | Admitting: OBSTETRICS & GYNECOLOGY
Payer: COMMERCIAL

## 2024-06-09 ENCOUNTER — ANESTHESIA (OUTPATIENT)
Dept: LABOR AND DELIVERY | Age: 24
End: 2024-06-09
Payer: COMMERCIAL

## 2024-06-09 ENCOUNTER — ANESTHESIA EVENT (OUTPATIENT)
Dept: LABOR AND DELIVERY | Age: 24
End: 2024-06-09
Payer: COMMERCIAL

## 2024-06-09 PROBLEM — O34.219 VAGINAL BIRTH AFTER CESAREAN: Status: ACTIVE | Noted: 2024-06-09

## 2024-06-09 LAB
ABO + RH BLD: NORMAL
BASOPHILS # BLD: 0 K/UL (ref 0–0.2)
BASOPHILS NFR BLD: 0 % (ref 0–2)
BLOOD GROUP ANTIBODIES SERPL: NORMAL
DIFFERENTIAL METHOD BLD: ABNORMAL
EOSINOPHIL # BLD: 0.1 K/UL (ref 0–0.8)
EOSINOPHIL NFR BLD: 1 % (ref 0.5–7.8)
ERYTHROCYTE [DISTWIDTH] IN BLOOD BY AUTOMATED COUNT: 15.9 % (ref 11.9–14.6)
HCT VFR BLD AUTO: 38.3 % (ref 35.8–46.3)
HGB BLD-MCNC: 12.5 G/DL (ref 11.7–15.4)
IMM GRANULOCYTES # BLD AUTO: 0.1 K/UL (ref 0–0.5)
IMM GRANULOCYTES NFR BLD AUTO: 1 % (ref 0–5)
LYMPHOCYTES # BLD: 1.5 K/UL (ref 0.5–4.6)
LYMPHOCYTES NFR BLD: 15 % (ref 13–44)
MCHC RBC AUTO-ENTMCNC: 32.6 G/DL (ref 31.4–35)
MCV RBC AUTO: 89.7 FL (ref 82–102)
MONOCYTES # BLD: 0.5 K/UL (ref 0.1–1.3)
MONOCYTES NFR BLD: 5 % (ref 4–12)
NEUTS SEG # BLD: 7.7 K/UL (ref 1.7–8.2)
NEUTS SEG NFR BLD: 78 % (ref 43–78)
NRBC # BLD: 0 K/UL (ref 0–0.2)
PLATELET # BLD AUTO: 198 K/UL (ref 150–450)
PMV BLD AUTO: 12 FL (ref 9.4–12.3)
RBC # BLD AUTO: 4.27 M/UL (ref 4.05–5.2)
SPECIMEN EXP DATE BLD: NORMAL
T PALLIDUM AB SER QL IA: NONREACTIVE
WBC # BLD AUTO: 9.9 K/UL (ref 4.3–11.1)

## 2024-06-09 PROCEDURE — 6360000002 HC RX W HCPCS: Performed by: OBSTETRICS & GYNECOLOGY

## 2024-06-09 PROCEDURE — 62325 NJX INTERLAMINAR CRV/THRC: CPT | Performed by: ANESTHESIOLOGY

## 2024-06-09 PROCEDURE — 7220000101 HC DELIVERY VAGINAL/SINGLE

## 2024-06-09 PROCEDURE — 6360000002 HC RX W HCPCS: Performed by: NURSE ANESTHETIST, CERTIFIED REGISTERED

## 2024-06-09 PROCEDURE — 00HU33Z INSERTION OF INFUSION DEVICE INTO SPINAL CANAL, PERCUTANEOUS APPROACH: ICD-10-PCS | Performed by: OBSTETRICS & GYNECOLOGY

## 2024-06-09 PROCEDURE — 1100000000 HC RM PRIVATE

## 2024-06-09 PROCEDURE — 10907ZC DRAINAGE OF AMNIOTIC FLUID, THERAPEUTIC FROM PRODUCTS OF CONCEPTION, VIA NATURAL OR ARTIFICIAL OPENING: ICD-10-PCS | Performed by: OBSTETRICS & GYNECOLOGY

## 2024-06-09 PROCEDURE — 86900 BLOOD TYPING SEROLOGIC ABO: CPT

## 2024-06-09 PROCEDURE — 86850 RBC ANTIBODY SCREEN: CPT

## 2024-06-09 PROCEDURE — 7210000100 HC LABOR FEE PER 1 HR

## 2024-06-09 PROCEDURE — 7100000011 HC PHASE II RECOVERY - ADDTL 15 MIN

## 2024-06-09 PROCEDURE — 6370000000 HC RX 637 (ALT 250 FOR IP): Performed by: OBSTETRICS & GYNECOLOGY

## 2024-06-09 PROCEDURE — 0UQGXZZ REPAIR VAGINA, EXTERNAL APPROACH: ICD-10-PCS | Performed by: OBSTETRICS & GYNECOLOGY

## 2024-06-09 PROCEDURE — 85025 COMPLETE CBC W/AUTO DIFF WBC: CPT

## 2024-06-09 PROCEDURE — 86901 BLOOD TYPING SEROLOGIC RH(D): CPT

## 2024-06-09 PROCEDURE — 2580000003 HC RX 258: Performed by: OBSTETRICS & GYNECOLOGY

## 2024-06-09 PROCEDURE — 51701 INSERT BLADDER CATHETER: CPT

## 2024-06-09 PROCEDURE — 7100000010 HC PHASE II RECOVERY - FIRST 15 MIN

## 2024-06-09 PROCEDURE — 86780 TREPONEMA PALLIDUM: CPT

## 2024-06-09 PROCEDURE — 0UQMXZZ REPAIR VULVA, EXTERNAL APPROACH: ICD-10-PCS | Performed by: OBSTETRICS & GYNECOLOGY

## 2024-06-09 RX ORDER — ONDANSETRON 2 MG/ML
4 INJECTION INTRAMUSCULAR; INTRAVENOUS EVERY 6 HOURS PRN
Status: DISCONTINUED | OUTPATIENT
Start: 2024-06-09 | End: 2024-06-09

## 2024-06-09 RX ORDER — ONDANSETRON 8 MG/1
8 TABLET, ORALLY DISINTEGRATING ORAL EVERY 8 HOURS PRN
Status: DISCONTINUED | OUTPATIENT
Start: 2024-06-09 | End: 2024-06-10 | Stop reason: HOSPADM

## 2024-06-09 RX ORDER — DOCUSATE SODIUM 100 MG/1
100 CAPSULE, LIQUID FILLED ORAL 2 TIMES DAILY
Status: DISCONTINUED | OUTPATIENT
Start: 2024-06-09 | End: 2024-06-10 | Stop reason: HOSPADM

## 2024-06-09 RX ORDER — TERBUTALINE SULFATE 1 MG/ML
0.25 INJECTION, SOLUTION SUBCUTANEOUS
Status: DISCONTINUED | OUTPATIENT
Start: 2024-06-09 | End: 2024-06-09

## 2024-06-09 RX ORDER — DEXTROSE, SODIUM CHLORIDE, SODIUM LACTATE, POTASSIUM CHLORIDE, AND CALCIUM CHLORIDE 5; .6; .31; .03; .02 G/100ML; G/100ML; G/100ML; G/100ML; G/100ML
INJECTION, SOLUTION INTRAVENOUS CONTINUOUS
Status: DISCONTINUED | OUTPATIENT
Start: 2024-06-09 | End: 2024-06-10 | Stop reason: HOSPADM

## 2024-06-09 RX ORDER — SODIUM CHLORIDE, SODIUM LACTATE, POTASSIUM CHLORIDE, AND CALCIUM CHLORIDE .6; .31; .03; .02 G/100ML; G/100ML; G/100ML; G/100ML
500 INJECTION, SOLUTION INTRAVENOUS PRN
Status: DISCONTINUED | OUTPATIENT
Start: 2024-06-09 | End: 2024-06-09

## 2024-06-09 RX ORDER — SODIUM CHLORIDE, SODIUM LACTATE, POTASSIUM CHLORIDE, AND CALCIUM CHLORIDE .6; .31; .03; .02 G/100ML; G/100ML; G/100ML; G/100ML
1000 INJECTION, SOLUTION INTRAVENOUS PRN
Status: DISCONTINUED | OUTPATIENT
Start: 2024-06-09 | End: 2024-06-09

## 2024-06-09 RX ORDER — OXYCODONE HYDROCHLORIDE 5 MG/1
10 TABLET ORAL EVERY 4 HOURS PRN
Status: DISCONTINUED | OUTPATIENT
Start: 2024-06-09 | End: 2024-06-10 | Stop reason: HOSPADM

## 2024-06-09 RX ORDER — FAMOTIDINE 20 MG/1
20 TABLET, FILM COATED ORAL 2 TIMES DAILY PRN
Status: DISCONTINUED | OUTPATIENT
Start: 2024-06-09 | End: 2024-06-10 | Stop reason: HOSPADM

## 2024-06-09 RX ORDER — ROPIVACAINE HYDROCHLORIDE 2 MG/ML
INJECTION, SOLUTION EPIDURAL; INFILTRATION; PERINEURAL CONTINUOUS PRN
Status: DISCONTINUED | OUTPATIENT
Start: 2024-06-09 | End: 2024-06-09 | Stop reason: SDUPTHER

## 2024-06-09 RX ORDER — LANOLIN
CREAM (ML) TOPICAL PRN
Status: DISCONTINUED | OUTPATIENT
Start: 2024-06-09 | End: 2024-06-10 | Stop reason: HOSPADM

## 2024-06-09 RX ORDER — FLUTICASONE PROPIONATE 50 MCG
1 SPRAY, SUSPENSION (ML) NASAL DAILY
Status: DISCONTINUED | OUTPATIENT
Start: 2024-06-10 | End: 2024-06-10 | Stop reason: HOSPADM

## 2024-06-09 RX ORDER — METHYLERGONOVINE MALEATE 0.2 MG/ML
200 INJECTION INTRAVENOUS PRN
Status: DISCONTINUED | OUTPATIENT
Start: 2024-06-09 | End: 2024-06-09

## 2024-06-09 RX ORDER — SODIUM CHLORIDE 9 MG/ML
INJECTION, SOLUTION INTRAVENOUS PRN
Status: DISCONTINUED | OUTPATIENT
Start: 2024-06-09 | End: 2024-06-09

## 2024-06-09 RX ORDER — IBUPROFEN 800 MG/1
800 TABLET ORAL EVERY 8 HOURS SCHEDULED
Status: DISCONTINUED | OUTPATIENT
Start: 2024-06-09 | End: 2024-06-10 | Stop reason: HOSPADM

## 2024-06-09 RX ORDER — TRANEXAMIC ACID 10 MG/ML
1000 INJECTION, SOLUTION INTRAVENOUS
Status: DISCONTINUED | OUTPATIENT
Start: 2024-06-09 | End: 2024-06-09

## 2024-06-09 RX ORDER — SODIUM CHLORIDE 0.9 % (FLUSH) 0.9 %
5-40 SYRINGE (ML) INJECTION EVERY 12 HOURS SCHEDULED
Status: DISCONTINUED | OUTPATIENT
Start: 2024-06-09 | End: 2024-06-09

## 2024-06-09 RX ORDER — ACETAMINOPHEN 500 MG
1000 TABLET ORAL EVERY 8 HOURS SCHEDULED
Status: DISCONTINUED | OUTPATIENT
Start: 2024-06-09 | End: 2024-06-10 | Stop reason: HOSPADM

## 2024-06-09 RX ORDER — ALBUTEROL SULFATE 90 UG/1
1 AEROSOL, METERED RESPIRATORY (INHALATION) EVERY 4 HOURS PRN
Status: DISCONTINUED | OUTPATIENT
Start: 2024-06-09 | End: 2024-06-10 | Stop reason: HOSPADM

## 2024-06-09 RX ORDER — VALACYCLOVIR HYDROCHLORIDE 500 MG/1
500 TABLET, FILM COATED ORAL 2 TIMES DAILY
Status: DISCONTINUED | OUTPATIENT
Start: 2024-06-09 | End: 2024-06-10 | Stop reason: HOSPADM

## 2024-06-09 RX ORDER — ONDANSETRON 4 MG/1
4 TABLET, ORALLY DISINTEGRATING ORAL EVERY 6 HOURS PRN
Status: DISCONTINUED | OUTPATIENT
Start: 2024-06-09 | End: 2024-06-09

## 2024-06-09 RX ORDER — SODIUM CHLORIDE 0.9 % (FLUSH) 0.9 %
5-40 SYRINGE (ML) INJECTION PRN
Status: DISCONTINUED | OUTPATIENT
Start: 2024-06-09 | End: 2024-06-09

## 2024-06-09 RX ORDER — SODIUM CHLORIDE 9 MG/ML
INJECTION, SOLUTION INTRAVENOUS PRN
Status: DISCONTINUED | OUTPATIENT
Start: 2024-06-09 | End: 2024-06-10 | Stop reason: HOSPADM

## 2024-06-09 RX ORDER — METHYLERGONOVINE MALEATE 0.2 MG/ML
200 INJECTION INTRAVENOUS PRN
Status: DISCONTINUED | OUTPATIENT
Start: 2024-06-09 | End: 2024-06-10 | Stop reason: HOSPADM

## 2024-06-09 RX ORDER — OXYCODONE HYDROCHLORIDE 5 MG/1
5 TABLET ORAL EVERY 4 HOURS PRN
Status: DISCONTINUED | OUTPATIENT
Start: 2024-06-09 | End: 2024-06-10 | Stop reason: HOSPADM

## 2024-06-09 RX ORDER — SIMETHICONE 80 MG
80 TABLET,CHEWABLE ORAL EVERY 6 HOURS PRN
Status: DISCONTINUED | OUTPATIENT
Start: 2024-06-09 | End: 2024-06-10 | Stop reason: HOSPADM

## 2024-06-09 RX ORDER — MISOPROSTOL 200 UG/1
400 TABLET ORAL PRN
Status: DISCONTINUED | OUTPATIENT
Start: 2024-06-09 | End: 2024-06-09

## 2024-06-09 RX ORDER — DEXTROSE, SODIUM CHLORIDE, SODIUM LACTATE, POTASSIUM CHLORIDE, AND CALCIUM CHLORIDE 5; .6; .31; .03; .02 G/100ML; G/100ML; G/100ML; G/100ML; G/100ML
INJECTION, SOLUTION INTRAVENOUS CONTINUOUS
Status: DISCONTINUED | OUTPATIENT
Start: 2024-06-09 | End: 2024-06-09

## 2024-06-09 RX ORDER — MISOPROSTOL 200 UG/1
200 TABLET ORAL PRN
Status: DISCONTINUED | OUTPATIENT
Start: 2024-06-09 | End: 2024-06-10 | Stop reason: HOSPADM

## 2024-06-09 RX ADMIN — Medication: at 19:39

## 2024-06-09 RX ADMIN — DOCUSATE SODIUM 100 MG: 100 CAPSULE, LIQUID FILLED ORAL at 22:50

## 2024-06-09 RX ADMIN — Medication 166.7 ML: at 16:16

## 2024-06-09 RX ADMIN — SODIUM CHLORIDE 5 MILLION UNITS: 900 INJECTION INTRAVENOUS at 10:13

## 2024-06-09 RX ADMIN — OXYTOCIN 87.3 MILLI-UNITS/MIN: 10 INJECTION, SOLUTION INTRAMUSCULAR; INTRAVENOUS at 16:27

## 2024-06-09 RX ADMIN — SODIUM CHLORIDE, SODIUM LACTATE, POTASSIUM CHLORIDE, CALCIUM CHLORIDE AND DEXTROSE MONOHYDRATE: 5; 600; 310; 30; 20 INJECTION, SOLUTION INTRAVENOUS at 10:13

## 2024-06-09 RX ADMIN — Medication 2.5 MILLION UNITS: at 14:18

## 2024-06-09 RX ADMIN — IBUPROFEN 800 MG: 800 TABLET ORAL at 21:58

## 2024-06-09 RX ADMIN — WITCH HAZEL: 500 SOLUTION RECTAL; TOPICAL at 19:39

## 2024-06-09 RX ADMIN — ROPIVACAINE HYDROCHLORIDE 8 ML/HR: 2 INJECTION, SOLUTION EPIDURAL; INFILTRATION; PERINEURAL at 11:20

## 2024-06-09 RX ADMIN — ROPIVACAINE HYDROCHLORIDE 5 ML: 2 INJECTION, SOLUTION EPIDURAL; INFILTRATION; PERINEURAL at 11:17

## 2024-06-09 RX ADMIN — ACETAMINOPHEN 1000 MG: 500 TABLET, FILM COATED ORAL at 18:26

## 2024-06-09 ASSESSMENT — PAIN SCALES - GENERAL: PAINLEVEL_OUTOF10: 3

## 2024-06-09 ASSESSMENT — PAIN DESCRIPTION - DESCRIPTORS: DESCRIPTORS: CRAMPING

## 2024-06-09 ASSESSMENT — PAIN DESCRIPTION - LOCATION: LOCATION: ABDOMEN

## 2024-06-09 NOTE — L&D DELIVERY NOTE
Nima Lyon [[id number]]      Labor Events     Labor: No   Steroids: None  Cervical Ripening Date/Time:      Antibiotics Received during Labor: Yes  Rupture Identifier: Sac 1  Rupture Date/Time:  24 11:53:00   Rupture Type: AROM  Fluid Color: Clear  Fluid Odor: None  Fluid Volume: Moderate  Augmentation: AROM  Labor Complications: None              Anesthesia    Method: Epidural       Labor Event Times      Labor onset date/time:        Dilation complete date/time:  24 15:58:00     Start pushing date/time:  2024 15:58:00   Decision date/time (emergent ):            Labor Length    2nd stage: 0h 12m  3rd stage: 0h 06m       Delivery Details      Delivery Date: 24 Delivery Time: 16:10:00   Delivery Type: , Spontaneous              [place] Presentation    Presentation: Vertex  Position: Right  _: Occiput  _: Anterior       Shoulder Dystocia    Shoulder Dystocia Present?: No       Assisted Delivery Details    Forceps Attempted?: No  Vacuum Extractor Attempted?: No                           Cord    Vessels: 3 Vessels  Complications: None  Delayed Cord Clamping?: Yes  Cord Clamped Date/Time: 2024 16:11:00  Cord Blood Disposition: Lab  Gases Sent?: No              Placenta    Date/Time: 2024 16:16:00  Removal: Spontaneous  Appearance: Intact  Disposition: Discarded       Lacerations    Episiotomy: None  Perineal Lacerations: None  Other Lacerations: labial laceration, vaginal laceration  Labial Laceration: bilateral Repaired?: Yes     Vaginal Laceration?: Yes Repaired?: Yes   Number of Repair Packets: 3       Vaginal Counts    Initial Count Personnel: TG  Initial Count Verified By: YAA  Intial Sponge Count: Correct Intial Needles Count: Correct Intial Instruments Count: Correct   Final Sponges Count: Correct Final Needles  Count: Correct Final Instruments Count: Correct   Final Count Personnel: TG  Final Count Verified By: YAA  Accurate Final Count?: Yes       Blood

## 2024-06-09 NOTE — H&P
History & Physical    Name: Vinita Lyon MRN: [id number]  SSN: [id number]    YOB: 2000  Age: 23 y.o.  Sex: female        Subjective:     Estimated Date of Delivery: 24  OB History    Para Term  AB Living   4 1 1 0 2 1   SAB IAB Ectopic Molar Multiple Live Births   2         1      # Outcome Date GA Lbr [place]/2nd Weight Sex Delivery Anes PTL Lv   4 Current            3 SAB 2023           2 SAB 2023           1 Term 19 39w1d  3.465 kg (7 lb 10.2 oz) M CS-LTranv EPI N ROBERT       Ms. Lyon is admitted with pregnancy at 39w4d for active labor. Prenatal course was complicated by  prior CS for HSV outbreak.  Denies current symptoms.  Had abnl glucola but NL 3hr GTT.  Has had mild poly in 3rd trimester, AF=24. . Please see prenatal records for details. Prev US EFW 64%, symmetric.   Currently comfortable with epidural.    Past Medical History:   Diagnosis Date    Asthma     Eczema     Herpes gestationis     last outbreak was 2 weeks ago    Herpes simplex virus (HSV) infection     Herpes simplex vulvovaginitis 2019    noted     HSV-2 seropositive     Severe obesity (HCC) 2019     Past Surgical History:   Procedure Laterality Date     SECTION      x1     Social History     Occupational History    Not on file   Tobacco Use    Smoking status: Never    Smokeless tobacco: Never   Substance and Sexual Activity    Alcohol use: No    Drug use: Never    Sexual activity: Yes     Partners: Male     Family History   Problem Relation Age of Onset    No Known Problems Father     Asthma Mother     Breast Cancer Neg Hx     Uterine Cancer Neg Hx     Ovarian Cancer Neg Hx     Colon Cancer Neg Hx        No Known Allergies  Prior to Admission medications    Medication Sig Start Date End Date Taking? Authorizing Provider   sodium chloride nebulizer 0.9 % solution  24   ProviderAnnie MD   fluticasone (FLONASE) 50 MCG/ACT nasal spray 1 spray in each nostril Nasally

## 2024-06-10 VITALS
HEART RATE: 77 BPM | OXYGEN SATURATION: 100 % | SYSTOLIC BLOOD PRESSURE: 129 MMHG | TEMPERATURE: 98 F | DIASTOLIC BLOOD PRESSURE: 75 MMHG | RESPIRATION RATE: 18 BRPM

## 2024-06-10 PROCEDURE — 6370000000 HC RX 637 (ALT 250 FOR IP): Performed by: OBSTETRICS & GYNECOLOGY

## 2024-06-10 RX ORDER — IBUPROFEN 800 MG/1
800 TABLET ORAL EVERY 6 HOURS PRN
Qty: 60 TABLET | Refills: 1 | Status: SHIPPED | OUTPATIENT
Start: 2024-06-10

## 2024-06-10 RX ADMIN — DOCUSATE SODIUM 100 MG: 100 CAPSULE, LIQUID FILLED ORAL at 08:08

## 2024-06-10 RX ADMIN — Medication: at 17:30

## 2024-06-10 RX ADMIN — WITCH HAZEL: 500 SOLUTION RECTAL; TOPICAL at 17:30

## 2024-06-10 RX ADMIN — ACETAMINOPHEN 1000 MG: 500 TABLET, FILM COATED ORAL at 12:44

## 2024-06-10 RX ADMIN — IBUPROFEN 800 MG: 800 TABLET ORAL at 08:08

## 2024-06-10 RX ADMIN — ACETAMINOPHEN 1000 MG: 500 TABLET, FILM COATED ORAL at 02:01

## 2024-06-10 NOTE — LACTATION NOTE
This note was copied from a baby's chart.  In to see mom and infant for first time and early discharge request. Mom is experienced and  her first child x 18 months. Good milk supply. Mom feels this  is latching and feeding well. No issues. Reviewed admission and discharge info. Measured nipples for her personal pump and discussed recommendations. Mom has no needs or questions at this time. Declined needing any help w/ latching or breastfeeding today.   
This note was copied from a baby's chart.  Mom and baby are going home today.  Continue to offer the breast without restriction.  Mom's milk should be fully in over the next few days.  Reviewed engorgement precautions.  Hand Expression has been demoed and written hand-out reviewed.  As milk comes in baby will be more alert at the breast and swallows will be more obvious.  Breasts may feel softer once baby has finished nursing.  Baby should be back to birth weight by 2 weeks of age.  And then gain on average 1 oz per day for the next 2-3 months.  Reviewed babies should be exclusively breastfeeding for the first 6 months and that breastfeeding should continue after introduction of appropriate complimentary foods after 6 months.  Initial output should be at least 1 wet and 1 bowel movement for each day old baby is.  By day 5-7 once milk is fully in baby will consistently have 6 or more soaking wet diapers and about 4 bowel movement.  Some babies have a bowel movement with every feeding and some have 1-3 large bowel movements each day.  Inadequate output may indicate inadequate feedings and should be reported to your Pediatrician.  Bowel habits may change as baby gets older.  Encouraged follow-up at Pediatrician in 1-2 days, no later than 1 week of age.  Call OP Lactation Center for any questions as needed or to set up an OP visit.  OP phone calls are returned within 24 hours. Community Breastfeeding Resource List given.    
should be delay pacifier use until breastfeeding is well-established, usually about 3 to 4 wk after birth.  Pacifiers are not available on the Mother Infant Unit.  Artificial nipples should also be avoided until breastfeeding is well established.    
25-Feb-2019

## 2024-06-10 NOTE — DISCHARGE SUMMARY
appt       Activity: no lifting, Driving, or Strenuous exercise for at least 2-3 weeks and no sex for at least 6 weeks  Diet: regular diet  Wound Care: keep wound clean and dry and none needed

## 2024-06-10 NOTE — PROGRESS NOTES
Admission assessment complete as noted. Patient oriented to room and unit. Plan of care reviewed and patient verbalizes understanding. Questions encouraged and answered. Patent encouraged to call for needs or concerns.   Safety Teaching reviewed:   Hand hygiene prior to handling the infant.  Use of bulb syringe.  Bracelets with matching numbers are placed on mother and infant  An infant security tag  (Hugs) is placed on the infant's ankle and monitored  All OB nurses wear pink Employee badges - do not give your baby to anyone without proper identification.   Never leave the baby alone in the room.  The infant should be placed on their back to sleep.on a firm mattress. No toys should be placed in the crib. (safe sleep video offered to view)  Never shake the baby (video offered to view)  Infant fall prevention - do not sleep with the baby, and place the baby in the crib while ambulating.   Mother and Baby Care booklet given to Mother.

## 2024-06-10 NOTE — CARE COORDINATION
Chart reviewed - no needs identified.  SW met with patient to complete initial assessment.    Patient denies any history of postpartum depression/anxiety.    Patient given informational packet on  mood & anxiety disorders (resources/education).    Family denies any additional needs from  at this time.  Family has 's contact information should any needs/questions arise.    Juliana Mitchell, HAL-JANES, PM-C  [facility]   [phone]

## 2024-06-10 NOTE — PROGRESS NOTES
Patient up to bathroom with stand by assistance.  Bita-care taught and completed. Questions encouraged and answered.  Patient ambulating without difficulty, encouraged to call for needs or concerns. Verbalizes understanding.

## 2024-06-10 NOTE — PLAN OF CARE
Problem: Postpartum  Goal: Experiences normal postpartum course  Description:  Postpartum OB-Pregnancy care plan goal which identifies if the mother is experiencing a normal postpartum course  Outcome: Progressing  Goal: Appropriate maternal -  bonding  Description:  Postpartum OB-Pregnancy care plan goal which identifies if the mother and  are bonding appropriately  Outcome: Progressing  Goal: Establishment of infant feeding pattern  Description:  Postpartum OB-Pregnancy care plan goal which identifies if the mother is establishing a feeding pattern with their   Outcome: Progressing  Goal: Incisions, wounds, or drain sites healing without S/S of infection  Outcome: Progressing     Problem: Pain  Goal: Verbalizes/displays adequate comfort level or baseline comfort level  Outcome: Progressing     Problem: Discharge Planning  Goal: Discharge to home or other facility with appropriate resources  Outcome: Progressing     Problem: Vaginal Birth or  Section  Goal: Fetal and maternal status remain reassuring during the birth process  Description:  Birth OB-Pregnancy care plan goal which identifies if the fetal and maternal status remain reassuring during the birth process  Outcome: Completed

## 2024-06-11 ENCOUNTER — CARE COORDINATION (OUTPATIENT)
Dept: OTHER | Facility: CLINIC | Age: 24
End: 2024-06-11

## 2024-06-13 ENCOUNTER — CARE COORDINATION (OUTPATIENT)
Dept: OTHER | Facility: CLINIC | Age: 24
End: 2024-06-13

## 2024-06-24 ENCOUNTER — CARE COORDINATION (OUTPATIENT)
Dept: OTHER | Facility: CLINIC | Age: 24
End: 2024-06-24

## 2024-07-25 ENCOUNTER — POSTPARTUM VISIT (OUTPATIENT)
Dept: OBGYN CLINIC | Age: 24
End: 2024-07-25

## 2024-07-25 VITALS
HEIGHT: 60 IN | WEIGHT: 191 LBS | BODY MASS INDEX: 37.5 KG/M2 | SYSTOLIC BLOOD PRESSURE: 114 MMHG | DIASTOLIC BLOOD PRESSURE: 62 MMHG

## 2024-07-25 PROBLEM — O99.820 GBS (GROUP B STREPTOCOCCUS CARRIER), +RV CULTURE, CURRENTLY PREGNANT: Status: RESOLVED | Noted: 2024-05-19 | Resolved: 2024-07-25

## 2024-07-25 PROBLEM — O99.519 ASTHMA AFFECTING PREGNANCY, ANTEPARTUM: Status: RESOLVED | Noted: 2023-10-19 | Resolved: 2024-07-25

## 2024-07-25 PROBLEM — O34.219 VAGINAL BIRTH AFTER CESAREAN: Status: RESOLVED | Noted: 2024-06-09 | Resolved: 2024-07-25

## 2024-07-25 PROBLEM — J45.909 ASTHMA AFFECTING PREGNANCY, ANTEPARTUM: Status: RESOLVED | Noted: 2023-10-19 | Resolved: 2024-07-25

## 2024-07-25 PROBLEM — O98.513 HERPES VIRUS INFECTION IN MOTHER DURING THIRD TRIMESTER OF PREGNANCY: Status: RESOLVED | Noted: 2023-10-19 | Resolved: 2024-07-25

## 2024-07-25 PROBLEM — O09.299 HISTORY OF PRIOR PREGNANCY WITH SHORT CERVIX, CURRENTLY PREGNANT: Status: RESOLVED | Noted: 2023-10-19 | Resolved: 2024-07-25

## 2024-07-25 PROBLEM — B00.9 HERPES VIRUS INFECTION IN MOTHER DURING THIRD TRIMESTER OF PREGNANCY: Status: RESOLVED | Noted: 2023-10-19 | Resolved: 2024-07-25

## 2024-07-25 PROBLEM — O34.219 PREVIOUS CESAREAN DELIVERY, ANTEPARTUM: Status: RESOLVED | Noted: 2023-10-19 | Resolved: 2024-07-25

## 2024-07-25 PROBLEM — O40.3XX0 POLYHYDRAMNIOS IN THIRD TRIMESTER: Status: RESOLVED | Noted: 2024-05-10 | Resolved: 2024-07-25

## 2024-07-25 PROBLEM — Z34.83 MULTIGRAVIDA IN THIRD TRIMESTER: Status: RESOLVED | Noted: 2023-10-19 | Resolved: 2024-07-25

## 2024-07-25 PROBLEM — O99.810 ABNORMAL GLUCOSE AFFECTING PREGNANCY: Status: RESOLVED | Noted: 2024-03-20 | Resolved: 2024-07-25

## 2024-07-25 PROCEDURE — 0503F POSTPARTUM CARE VISIT: CPT | Performed by: OBSTETRICS & GYNECOLOGY

## 2024-07-25 NOTE — PROGRESS NOTES
Chaperone for Intimate Exam     Chaperone was offer accepted as part of the rooming process    Chaperone: Maryan Shepherd     
Patient comes in today for 6 week post partum visit.     Delivery Method: Vaginal ()    Delivery Date: 2024    Birth Control: none    Breast/Bottle: breastfeeding     Bleeding: scant-small     Baby: Doing well    Bowel/Bladder: No issues    Blues: None    Last pap smear:  10/19/2023, neg.,     Vitals:    24 0850   BP: 114/62        JOAQUÍN SALES RN  8:56 AM  24    
   Overall Financial Resource Strain (CARDIA)     Difficulty of Paying Living Expenses: Not hard at all   Food Insecurity: No Food Insecurity (12/28/2023)    Hunger Vital Sign     Worried About Running Out of Food in the Last Year: Never true     Ran Out of Food in the Last Year: Never true   Transportation Needs: Unknown (12/28/2023)    PRAPARE - Transportation     Lack of Transportation (Medical): Not on file     Lack of Transportation (Non-Medical): No   Physical Activity: Not on file   Stress: Not on file   Social Connections: Not on file   Intimate Partner Violence: Not on file   Housing Stability: Unknown (12/28/2023)    Housing Stability Vital Sign     Unable to Pay for Housing in the Last Year: Not on file     Number of Places Lived in the Last Year: Not on file     Unstable Housing in the Last Year: No       Vitals:    07/25/24 0850   BP: 114/62   Site: Left Upper Arm   Position: Sitting   Weight: 86.6 kg (191 lb)   Height: 1.524 m (5')        Review of Systems    Constitutional:  No fevers or chills    CV: No chest pain or palpitations    Resp: No SOB or cough    GI:  No nausea/vomiting/diarrhea/constipation    Neuro: No HA, no seizure like activity    Skin: No rashes or lesions    Breast: No breast pain, masses, bleeding    : No dysuria or hematuria    Gyn:  No menstrual, pelvic issues      Physical Exam    General:  well developed, well nourished, in no acute distress     Head:   normocephalic and atraumatic     Mouth:  no deformity or lesions with good dentition     Neck:  no masses, thyromegaly, or abnormal cervical nodes     Lungs:  clear bilaterally with normal respirations     Heart:   regular rate and rhythm, S1, S2 without murmurs     Abdomen:  bowel sounds positive; abdomen soft and non-tender without masses, organomegaly, or hernias noted     Pelvic Exam:       External: normal female genitalia without lesions or masses       Vagina: normal without lesions or masses       Cervix: normal

## 2024-08-05 ENCOUNTER — TELEPHONE (OUTPATIENT)
Dept: OBGYN CLINIC | Age: 24
End: 2024-08-05

## 2024-08-05 NOTE — TELEPHONE ENCOUNTER
Pt lvm stating she delivered on 06/09/2024 and when she came in for her ppv she had light bleeding but now it is heavier and she is having a lot of cramping. Pt states she wants to know if this is normal. Called pt back, no answer, LVM.

## 2024-10-07 ENCOUNTER — TELEPHONE (OUTPATIENT)
Dept: OBGYN CLINIC | Age: 24
End: 2024-10-07

## 2024-10-07 NOTE — TELEPHONE ENCOUNTER
Called patient to update her about IUD, no answer left msg. Myself and [facility] has been unable to contact patient. DI

## 2024-11-25 ENCOUNTER — TELEPHONE (OUTPATIENT)
Dept: OBGYN CLINIC | Age: 24
End: 2024-11-25

## 2024-11-25 NOTE — TELEPHONE ENCOUNTER
Patient LM stating she woke up with a yeast infection this morning. She stated that she has been on 3 different antibiotics. She stated she is leaving tomorrow to go out of town for Thanksgiving.     I called the patient back to address her concerns. Patient advised that Dr. Ace was not in the office today. She stated [facility] gave her antibiotics. I advised that she call them to see if they would give her the rx for yeast since they have record of antibiotics. Patient advised that otherwise she would have to be seen. Patient voiced understanding. winsome

## 2025-02-10 ENCOUNTER — ROUTINE PRENATAL (OUTPATIENT)
Dept: OBGYN CLINIC | Age: 25
End: 2025-02-10

## 2025-02-10 ENCOUNTER — PROCEDURE VISIT (OUTPATIENT)
Dept: OBGYN CLINIC | Age: 25
End: 2025-02-10

## 2025-02-10 VITALS
HEIGHT: 60 IN | WEIGHT: 199 LBS | BODY MASS INDEX: 39.07 KG/M2 | DIASTOLIC BLOOD PRESSURE: 64 MMHG | SYSTOLIC BLOOD PRESSURE: 110 MMHG

## 2025-02-10 DIAGNOSIS — O98.511 HERPES SIMPLEX VIRUS TYPE 2 (HSV-2) INFECTION AFFECTING PREGNANCY IN FIRST TRIMESTER: ICD-10-CM

## 2025-02-10 DIAGNOSIS — Z34.81 MULTIGRAVIDA IN FIRST TRIMESTER: Primary | ICD-10-CM

## 2025-02-10 DIAGNOSIS — Z87.59 HISTORY OF POLYHYDRAMNIOS: ICD-10-CM

## 2025-02-10 DIAGNOSIS — O36.80X0 ENCOUNTER TO DETERMINE FETAL VIABILITY OF PREGNANCY, SINGLE OR UNSPECIFIED FETUS: Primary | ICD-10-CM

## 2025-02-10 DIAGNOSIS — O26.841 SIZE OF FETUS INCONSISTENT WITH DATES IN FIRST TRIMESTER: ICD-10-CM

## 2025-02-10 DIAGNOSIS — Z34.81 MULTIGRAVIDA IN FIRST TRIMESTER: ICD-10-CM

## 2025-02-10 DIAGNOSIS — B00.9 HERPES SIMPLEX VIRUS TYPE 2 (HSV-2) INFECTION AFFECTING PREGNANCY IN FIRST TRIMESTER: ICD-10-CM

## 2025-02-10 DIAGNOSIS — O34.219 PREVIOUS CESAREAN DELIVERY, ANTEPARTUM: ICD-10-CM

## 2025-02-10 DIAGNOSIS — O09.899 SHORT INTERVAL BETWEEN PREGNANCIES AFFECTING PREGNANCY, ANTEPARTUM: ICD-10-CM

## 2025-02-10 LAB
ABO + RH BLD: NORMAL
AMPHET UR QL SCN: NEGATIVE
BARBITURATES UR QL SCN: NEGATIVE
BASOPHILS # BLD: 0.03 K/UL (ref 0–0.2)
BASOPHILS NFR BLD: 0.3 % (ref 0–2)
BENZODIAZ UR QL: NEGATIVE
BLOOD GROUP ANTIBODIES SERPL: NORMAL
CANNABINOIDS UR QL SCN: NEGATIVE
COCAINE UR QL SCN: NEGATIVE
DIFFERENTIAL METHOD BLD: NORMAL
EOSINOPHIL # BLD: 0.19 K/UL (ref 0–0.8)
EOSINOPHIL NFR BLD: 1.9 % (ref 0.5–7.8)
ERYTHROCYTE [DISTWIDTH] IN BLOOD BY AUTOMATED COUNT: 13.1 % (ref 11.9–14.6)
EST. AVERAGE GLUCOSE BLD GHB EST-MCNC: 104 MG/DL
HBA1C MFR BLD: 5.3 % (ref 0–5.6)
HBV SURFACE AG SER QL: NONREACTIVE
HCT VFR BLD AUTO: 38.9 % (ref 35.8–46.3)
HCV AB SER QL: NONREACTIVE
HGB BLD-MCNC: 13 G/DL (ref 11.7–15.4)
HIV 1+2 AB+HIV1 P24 AG SERPL QL IA: NONREACTIVE
HIV 1/2 RESULT COMMENT: NORMAL
IMM GRANULOCYTES # BLD AUTO: 0.05 K/UL (ref 0–0.5)
IMM GRANULOCYTES NFR BLD AUTO: 0.5 % (ref 0–5)
LYMPHOCYTES # BLD: 1.92 K/UL (ref 0.5–4.6)
LYMPHOCYTES NFR BLD: 19.5 % (ref 13–44)
MCH RBC QN AUTO: 30.4 PG (ref 26.1–32.9)
MCHC RBC AUTO-ENTMCNC: 33.4 G/DL (ref 31.4–35)
MCV RBC AUTO: 90.9 FL (ref 82–102)
METHADONE UR QL: NEGATIVE
MONOCYTES # BLD: 0.53 K/UL (ref 0.1–1.3)
MONOCYTES NFR BLD: 5.4 % (ref 4–12)
NEUTS SEG # BLD: 7.14 K/UL (ref 1.7–8.2)
NEUTS SEG NFR BLD: 72.4 % (ref 43–78)
NRBC # BLD: 0 K/UL (ref 0–0.2)
OPIATES UR QL: NEGATIVE
PCP UR QL: NEGATIVE
PLATELET # BLD AUTO: 306 K/UL (ref 150–450)
PMV BLD AUTO: 10.6 FL (ref 9.4–12.3)
RBC # BLD AUTO: 4.28 M/UL (ref 4.05–5.2)
RUBV IGG SERPL IA-ACNC: <0.21 IU/ML
T PALLIDUM AB SER QL IA: NONREACTIVE
WBC # BLD AUTO: 9.9 K/UL (ref 4.3–11.1)

## 2025-02-10 PROCEDURE — 76801 OB US < 14 WKS SINGLE FETUS: CPT | Performed by: OBSTETRICS & GYNECOLOGY

## 2025-02-10 PROCEDURE — 99203 OFFICE O/P NEW LOW 30 MIN: CPT | Performed by: OBSTETRICS & GYNECOLOGY

## 2025-02-10 NOTE — PROGRESS NOTES
Patient comes in today for initial prenatal visit. No complaints/concerns today.    Fetal Movements:  No  Contractions:  No  Vaginal Bleeding:  No  Leaking Fluid:  No  GI/ issues:  No    Drug/Alcohol 4P's Plus Screening    1.  Have either of your parents ever had a problem with drugs/alcohol/prescription drugs? No  2.  Does your partner have a problem with drugs/alcohol/prescription drugs?  No  3.  In the past, have you ever had a problem with drugs/alcohol/prescription drugs?  No  4.  Before you were pregnant, in the past month, have you done any drugs, drank any alcohol or abused any prescription drugs?    No  If \"YES\" to any of the above, please give further details:  N/a     LAST PAP:  10/09/2023 neg     LAST MAMMO:  never     LMP:  Patient's last menstrual period was 11/24/2024.    FAMILY HISTORY OF:   Breast Cancer:  No   Ovarian Cancer:  No   Uterine Cancer:  No   Colon Cancer:  No    Vitals:    02/10/25 1321   BP: 110/64   Site: Left Upper Arm   Position: Sitting   Weight: 90.3 kg (199 lb)   Height: 1.524 m (5')        Umm Lopez MA  02/10/25  1:27 PM

## 2025-02-10 NOTE — PROGRESS NOTES
Chief Complaint   Patient presents with    Pregnancy Ultrasound    Initial Prenatal Visit        This 24 y.o.  at 8w5d with Estimated Date of Delivery: 25 presents for routine prenatal visit. Patient has no complaints today. Pt reports no LOF, VB, ctx. Pt denies H/A, vision changes, abdom pain, N/V.    Vitals:    02/10/25 1321   BP: 110/64   Site: Left Upper Arm   Position: Sitting   Weight: 90.3 kg (199 lb)   Height: 1.524 m (5')      Prenatal Physical       OB Prenatal Exam: Last filed by Lamberto Ace MD on 2/10/2025  1:36 PM       General Physical Exam       HEENT: normal  Heart: normal  Skin: normal     Thyroid: normal  Lungs: normal  Extremities: normal     Neurological: normal  Abdomen: normal                                  Patient Active Problem List    Diagnosis Date Noted    Multigravida in first trimester 02/10/2025     Overview Note:     EDC by 8 5/7 week US not C/W LMP       Assessment & Plan Note:     Instructed pt to contact the office or seek immediate care if develops fever > 101.0, severe lower abdominal pain or heavy vaginal bleeding (soaking 2 or more pads per hour).    PNLs, new OB packet today      Herpes simplex virus type 2 (HSV-2) infection affecting pregnancy in first trimester 02/10/2025     Overview Note:     PLAN:  propho 35-36 weeks or sooner if outbreak occurs       Assessment & Plan Note:     No prodrome or lesions per pt      Short interval between pregnancies affecting pregnancy, antepartum 02/10/2025     Overview Note:     PLAN:  serial growth in 3rd trimester       Assessment & Plan Note:     noted      History of polyhydramnios 02/10/2025     Overview Note:     PLAN:  serial growth/OTILIA in 3rd trimester       Assessment & Plan Note:     noted      Previous  delivery, antepartum 10/19/2023     Overview Note:     C/S x 1 (HSV outbreak), successful  on 24  D/W pt risks of , including but not limited to: risk of uterine rupture during labor

## 2025-02-11 PROBLEM — O09.899 RUBELLA NON-IMMUNE STATUS, ANTEPARTUM: Status: ACTIVE | Noted: 2025-02-11

## 2025-02-11 PROBLEM — Z28.39 RUBELLA NON-IMMUNE STATUS, ANTEPARTUM: Status: ACTIVE | Noted: 2025-02-11

## 2025-02-11 LAB
C TRACH RRNA SPEC QL NAA+PROBE: NEGATIVE
N GONORRHOEA RRNA SPEC QL NAA+PROBE: NEGATIVE
SPECIMEN SOURCE: NORMAL
T VAGINALIS RRNA SPEC QL NAA+PROBE: NEGATIVE

## 2025-03-10 ENCOUNTER — ROUTINE PRENATAL (OUTPATIENT)
Dept: OBGYN CLINIC | Age: 25
End: 2025-03-10
Payer: MEDICAID

## 2025-03-10 VITALS
WEIGHT: 197 LBS | SYSTOLIC BLOOD PRESSURE: 110 MMHG | DIASTOLIC BLOOD PRESSURE: 68 MMHG | HEIGHT: 60 IN | BODY MASS INDEX: 38.68 KG/M2

## 2025-03-10 DIAGNOSIS — O09.899 RUBELLA NON-IMMUNE STATUS, ANTEPARTUM: ICD-10-CM

## 2025-03-10 DIAGNOSIS — O09.899 SHORT INTERVAL BETWEEN PREGNANCIES AFFECTING PREGNANCY, ANTEPARTUM: ICD-10-CM

## 2025-03-10 DIAGNOSIS — O98.511 HERPES SIMPLEX VIRUS TYPE 2 (HSV-2) INFECTION AFFECTING PREGNANCY IN FIRST TRIMESTER: ICD-10-CM

## 2025-03-10 DIAGNOSIS — B00.9 HERPES SIMPLEX VIRUS TYPE 2 (HSV-2) INFECTION AFFECTING PREGNANCY IN FIRST TRIMESTER: ICD-10-CM

## 2025-03-10 DIAGNOSIS — Z28.39 RUBELLA NON-IMMUNE STATUS, ANTEPARTUM: ICD-10-CM

## 2025-03-10 DIAGNOSIS — Z34.81 MULTIGRAVIDA IN FIRST TRIMESTER: Primary | ICD-10-CM

## 2025-03-10 DIAGNOSIS — O34.219 PREVIOUS CESAREAN DELIVERY, ANTEPARTUM: ICD-10-CM

## 2025-03-10 PROCEDURE — 99213 OFFICE O/P EST LOW 20 MIN: CPT | Performed by: NURSE PRACTITIONER

## 2025-03-10 SDOH — ECONOMIC STABILITY: FOOD INSECURITY: WITHIN THE PAST 12 MONTHS, YOU WORRIED THAT YOUR FOOD WOULD RUN OUT BEFORE YOU GOT MONEY TO BUY MORE.: NEVER TRUE

## 2025-03-10 SDOH — ECONOMIC STABILITY: FOOD INSECURITY: WITHIN THE PAST 12 MONTHS, THE FOOD YOU BOUGHT JUST DIDN'T LAST AND YOU DIDN'T HAVE MONEY TO GET MORE.: NEVER TRUE

## 2025-03-10 ASSESSMENT — PATIENT HEALTH QUESTIONNAIRE - PHQ9
SUM OF ALL RESPONSES TO PHQ QUESTIONS 1-9: 0
SUM OF ALL RESPONSES TO PHQ QUESTIONS 1-9: 0
1. LITTLE INTEREST OR PLEASURE IN DOING THINGS: NOT AT ALL
2. FEELING DOWN, DEPRESSED OR HOPELESS: NOT AT ALL
SUM OF ALL RESPONSES TO PHQ QUESTIONS 1-9: 0
SUM OF ALL RESPONSES TO PHQ QUESTIONS 1-9: 0

## 2025-03-10 NOTE — ASSESSMENT & PLAN NOTE
No outbreaks for > 1 year. We discuss starting BID dosing given previous outbreak resulting in need for C/S

## 2025-03-10 NOTE — PROGRESS NOTES
This is a 24 y.o.   at 12w5d for routine OB visit.    Her Estimated Due Date is 2025, by Ultrasound    Denies leaking of fluid, vaginal bleeding, or regular contractions.   Current Outpatient Medications on File Prior to Visit   Medication Sig Dispense Refill    Prenatal Vit w/Qo-Sgljtegon-BA (PNV PO) Take by mouth      albuterol sulfate HFA (PROVENTIL;VENTOLIN;PROAIR) 108 (90 Base) MCG/ACT inhaler Inhale 1-2 puffs into the lungs every 4 hours as needed for Shortness of Breath or Wheezing 18 g 3    valACYclovir (VALTREX) 500 MG tablet Take 1 tablet by mouth 2 times daily 60 tablet 6     No current facility-administered medications on file prior to visit.       No Known Allergies    OB History    Para Term  AB Living   5 2 2 0 2 2   SAB IAB Ectopic Molar Multiple Live Births   2 0 0 0 0 2       # 1 - Date: 19, Sex: Male, Weight: 3.465 kg (7 lb 10.2 oz), GA: 39w1d, Type: , Low Transverse, Apgar1: 8, Apgar5: 9, Living: Living, Birth Comments: None    # 2 - Date: 2023, Sex: None, Weight: None, GA: None, Type: None, Apgar1: None, Apgar5: None, Living: None, Birth Comments: None    # 3 - Date: 2023, Sex: None, Weight: None, GA: None, Type: None, Apgar1: None, Apgar5: None, Living: None, Birth Comments: None    # 4 - Date: 24, Sex: Female, Weight: 3.37 kg (7 lb 6.9 oz), GA: 39w4d, Type: , Spontaneous, Apgar1: 8, Apgar5: 9, Living: Living, Birth Comments: None    # 5 - Date: None, Sex: None, Weight: None, GA: None, Type: None, Apgar1: None, Apgar5: None, Living: None, Birth Comments: None        Past Medical History:   Diagnosis Date    Asthma     Eczema     Herpes gestationis     last outbreak was 2 weeks ago    Herpes simplex virus (HSV) infection     Herpes simplex vulvovaginitis 2019    noted     HSV-2 seropositive     Severe obesity 2019       Past Surgical History:   Procedure Laterality Date     SECTION      x1       Family History   Problem

## 2025-03-10 NOTE — PROGRESS NOTES
Patient comes in today for routine prenatal visit. No complaints/concerns today.     Pattient confirms wanting genetic blood work today.     Fetal Movement: No  Contractions: No  Vaginal Bleeding: No  Leaking Fluid: No  GI/: yes-nausea.    Vitals:    03/10/25 0807   BP: 110/68   BP Site: Left Upper Arm   Patient Position: Sitting   Weight: 89.4 kg (197 lb)   Height: 1.524 m (5')

## 2025-03-10 NOTE — ASSESSMENT & PLAN NOTE
PTL/labor precautions, FMC, and pregnancy warning signs reviewed. Pt advised to call the office at [phone] or go straight to Labor and Delivery at [facility] with any of the following concerns vaginal bleeding, leaking of fluid, neal regularly Q 5-7 minutes for over an hour or not feeling the baby move.   Rto 4 weeks OBV, afp  NIPt today

## 2025-03-15 LAB
Lab: NORMAL
NTRA FETAL FRACTION: NORMAL
NTRA FETAL RHD SUMMARY: NORMAL
NTRA GENDER OF FETUS: NORMAL
NTRA MONOSOMY X AGE-BASED RISK TEXT: NORMAL
NTRA MONOSOMY X RESULT TEXT: NORMAL
NTRA MONOSOMY X RISK SCORE TEXT: NORMAL
NTRA TRIPLOIDY RESULT TEXT: NORMAL
NTRA TRISOMY 13 AGE-BASED RISK TEXT: NORMAL
NTRA TRISOMY 13 RESULT TEXT: NORMAL
NTRA TRISOMY 13 RISK SCORE TEXT: NORMAL
NTRA TRISOMY 18 AGE-BASED RISK TEXT: NORMAL
NTRA TRISOMY 18 RESULT TEXT: NORMAL
NTRA TRISOMY 18 RISK SCORE TEXT: NORMAL
NTRA TRISOMY 21 AGE-BASED RISK TEXT: NORMAL
NTRA TRISOMY 21 RESULT TEXT: NORMAL
NTRA TRISOMY 21 RISK SCORE TEXT: NORMAL

## 2025-03-25 NOTE — TELEPHONE ENCOUNTER
Attempted to contact patient, call went straight to . LM for patient to call the office. No details given.  mc [FreeTextEntry1] : 3 y/o female here with distal radius fracture from the fall, here today for physis check.

## 2025-04-07 ENCOUNTER — ROUTINE PRENATAL (OUTPATIENT)
Dept: OBGYN CLINIC | Age: 25
End: 2025-04-07
Payer: MEDICAID

## 2025-04-07 VITALS
BODY MASS INDEX: 39.27 KG/M2 | HEIGHT: 60 IN | DIASTOLIC BLOOD PRESSURE: 72 MMHG | SYSTOLIC BLOOD PRESSURE: 112 MMHG | WEIGHT: 200 LBS

## 2025-04-07 DIAGNOSIS — B00.9 HERPES SIMPLEX VIRUS TYPE 2 (HSV-2) INFECTION AFFECTING PREGNANCY IN SECOND TRIMESTER: ICD-10-CM

## 2025-04-07 DIAGNOSIS — Z34.82 MULTIGRAVIDA IN SECOND TRIMESTER: ICD-10-CM

## 2025-04-07 DIAGNOSIS — O98.512 HERPES SIMPLEX VIRUS TYPE 2 (HSV-2) INFECTION AFFECTING PREGNANCY IN SECOND TRIMESTER: ICD-10-CM

## 2025-04-07 DIAGNOSIS — Z34.82 MULTIGRAVIDA IN SECOND TRIMESTER: Primary | ICD-10-CM

## 2025-04-07 DIAGNOSIS — O34.219 PREVIOUS CESAREAN DELIVERY, ANTEPARTUM: ICD-10-CM

## 2025-04-07 DIAGNOSIS — Z28.39 RUBELLA NON-IMMUNE STATUS, ANTEPARTUM: ICD-10-CM

## 2025-04-07 DIAGNOSIS — O09.899 RUBELLA NON-IMMUNE STATUS, ANTEPARTUM: ICD-10-CM

## 2025-04-07 DIAGNOSIS — O09.899 SHORT INTERVAL BETWEEN PREGNANCIES AFFECTING PREGNANCY, ANTEPARTUM: ICD-10-CM

## 2025-04-07 DIAGNOSIS — Z87.59 HISTORY OF POLYHYDRAMNIOS: ICD-10-CM

## 2025-04-07 PROCEDURE — 99213 OFFICE O/P EST LOW 20 MIN: CPT | Performed by: OBSTETRICS & GYNECOLOGY

## 2025-04-07 NOTE — PROGRESS NOTES
Patient comes in today for routine prenatal visit. No complaints/concerns today.     Fetal Movement: Yes some   Contractions: No  Vaginal Bleeding: No  Leaking Fluid: No   GI/: No     Vitals:    04/07/25 0856   BP: 112/72   BP Site: Right Upper Arm   Patient Position: Sitting   Weight: 90.7 kg (200 lb)   Height: 1.524 m (5')

## 2025-04-07 NOTE — PROGRESS NOTES
Chief Complaint   Patient presents with    Routine Prenatal Visit        This 24 y.o.  at 16w5d with Estimated Date of Delivery: 25 presents for routine prenatal visit. Patient has no complaints today. Pt reports good FM, no LOF, VB, ctx. Pt denies H/A, vision changes, abdom pain, N/V.    Vitals:    25 0856   BP: 112/72   BP Site: Right Upper Arm   Patient Position: Sitting   Weight: 90.7 kg (200 lb)   Height: 1.524 m (5')        Patient Active Problem List    Diagnosis Date Noted    Rubella non-immune status, antepartum 2025     Overview Note:     Imm PP       Assessment & Plan Note:     noted      Multigravida in second trimester 02/10/2025     Overview Note:     EDC by 8 5/7 week US not C/W LMP    2019: CF/SMA negative  3/15/25: NIPT low risk, FEMALE       Assessment & Plan Note:     Educated patient of signs and symptoms of  labor including but not limited to regular uterine contractions every 5-7 minutes for 1 hour, vaginal bleeding or leakage of fluid to seek immediate care.   D/W pt at length genetic testing that is recommended by ACOG -- NIPT, Quad screen (for trisomies and NTD), CF, SMA.  Brief discussion of these diseases - conditions that may increase risks, etiology, carrier states, fetal effects, treatment options, etc was undertaken. D/W pt that these are screening tests only and are NOT mandatory. It is her decision whether to have them done and how to proceed with the information afterwards.  All questions answered, pt understood and wishes to proceed with indicated tests.       Herpes simplex virus type 2 (HSV-2) infection affecting pregnancy in second trimester 02/10/2025     Overview Note:     PLAN:  propho 35-36 weeks or sooner if outbreak occurs         Assessment & Plan Note:     No prodrome or lesions per pt      Short interval between pregnancies affecting pregnancy, antepartum 02/10/2025     Overview Note:     PLAN:  serial growth in 3rd trimester

## 2025-04-07 NOTE — ASSESSMENT & PLAN NOTE
Educated patient of signs and symptoms of  labor including but not limited to regular uterine contractions every 5-7 minutes for 1 hour, vaginal bleeding or leakage of fluid to seek immediate care.   D/W pt at length genetic testing that is recommended by ACOG -- NIPT, Quad screen (for trisomies and NTD), CF, SMA.  Brief discussion of these diseases - conditions that may increase risks, etiology, carrier states, fetal effects, treatment options, etc was undertaken. D/W pt that these are screening tests only and are NOT mandatory. It is her decision whether to have them done and how to proceed with the information afterwards.  All questions answered, pt understood and wishes to proceed with indicated tests.

## 2025-04-07 NOTE — PATIENT INSTRUCTIONS
We will call you if anything is abnormal from your testing today.  Educated patient of signs and symptoms of  labor including but not limited to regular uterine contractions every 5-7 minutes for 1 hour, vaginal bleeding or leakage of fluid to seek immediate care.

## 2025-04-09 LAB
AFP INTERP SERPL-IMP: NORMAL
AFP MOM SERPL: 0.94
AFP SERPL-MCNC: 27.4 NG/ML
AGE AT DELIVERY: 26.7 YR
COMMENT: NORMAL
DONOR EGG?: NO
GA METHOD: NORMAL
GA: 16.4 WEEKS
IDDM PATIENT QL: NO
Lab: 200
Lab: NORMAL
MAT SCN FOR FETAL ABNORMALITIES SERPL: NORMAL
MULTIPLE PREGNANCY: NO
NEURAL TUBE DEFECT RISK FETUS: NORMAL
NUMBER OF FETUSES: NO
OTHER INDICATIONS: NO
PREVIOUSLY ELEVATED AFP (Y OR N): 16.5
PREVIOUSLY ELEVATED AFP (Y OR N): NO
PRIOR 1ST TRIM TESTING ?: NO
PRIOR 2ND TRIM TESTING ?: NO
PRIOR DS/NTD SCREEN CURRENT PREGNANCY?: NO
PRIOR PREGNANCY WITH DOWN SYNDROME (Y OR N): 1
PRIOR PREGNANCY WITH DOWN SYNDROME (Y OR N): NO
TYPE OF EGG DONOR: NORMAL

## 2025-04-10 ENCOUNTER — RESULTS FOLLOW-UP (OUTPATIENT)
Dept: OBGYN CLINIC | Age: 25
End: 2025-04-10

## 2025-05-07 ENCOUNTER — PROCEDURE VISIT (OUTPATIENT)
Dept: OBGYN CLINIC | Age: 25
End: 2025-05-07
Payer: MEDICAID

## 2025-05-07 ENCOUNTER — ROUTINE PRENATAL (OUTPATIENT)
Dept: OBGYN CLINIC | Age: 25
End: 2025-05-07
Payer: MEDICAID

## 2025-05-07 VITALS
DIASTOLIC BLOOD PRESSURE: 64 MMHG | BODY MASS INDEX: 39.85 KG/M2 | WEIGHT: 203 LBS | HEIGHT: 60 IN | SYSTOLIC BLOOD PRESSURE: 112 MMHG

## 2025-05-07 DIAGNOSIS — B00.9 HERPES SIMPLEX VIRUS TYPE 2 (HSV-2) INFECTION AFFECTING PREGNANCY IN SECOND TRIMESTER: ICD-10-CM

## 2025-05-07 DIAGNOSIS — O09.899 SHORT INTERVAL BETWEEN PREGNANCIES AFFECTING PREGNANCY, ANTEPARTUM: ICD-10-CM

## 2025-05-07 DIAGNOSIS — Z34.82 MULTIGRAVIDA IN SECOND TRIMESTER: ICD-10-CM

## 2025-05-07 DIAGNOSIS — B37.2 CANDIDAL SKIN INFECTION: ICD-10-CM

## 2025-05-07 DIAGNOSIS — Z28.39 RUBELLA NON-IMMUNE STATUS, ANTEPARTUM: ICD-10-CM

## 2025-05-07 DIAGNOSIS — Z87.59 HISTORY OF POLYHYDRAMNIOS: ICD-10-CM

## 2025-05-07 DIAGNOSIS — O09.899 RUBELLA NON-IMMUNE STATUS, ANTEPARTUM: ICD-10-CM

## 2025-05-07 DIAGNOSIS — Z34.82 MULTIGRAVIDA IN SECOND TRIMESTER: Primary | ICD-10-CM

## 2025-05-07 DIAGNOSIS — O98.512 HERPES SIMPLEX VIRUS TYPE 2 (HSV-2) INFECTION AFFECTING PREGNANCY IN SECOND TRIMESTER: ICD-10-CM

## 2025-05-07 DIAGNOSIS — O34.219 PREVIOUS CESAREAN DELIVERY, ANTEPARTUM: ICD-10-CM

## 2025-05-07 DIAGNOSIS — Z36.89 ENCOUNTER FOR FETAL ANATOMIC SURVEY: Primary | ICD-10-CM

## 2025-05-07 PROCEDURE — 76805 OB US >/= 14 WKS SNGL FETUS: CPT | Performed by: OBSTETRICS & GYNECOLOGY

## 2025-05-07 PROCEDURE — 99213 OFFICE O/P EST LOW 20 MIN: CPT | Performed by: NURSE PRACTITIONER

## 2025-05-07 RX ORDER — NYSTATIN AND TRIAMCINOLONE ACETONIDE 100000; 1 [USP'U]/G; MG/G
CREAM TOPICAL
Qty: 30 G | Refills: 0 | Status: SHIPPED | OUTPATIENT
Start: 2025-05-07

## 2025-05-07 NOTE — PROGRESS NOTES
I have reviewed the patient's visit today including history, exam and assessment by MANNIE Mehta.  I agree with treatment/plan as above.    Lamberto Ace MD  11:21 AM  05/07/25   
Patient comes in today for routine prenatal visit. Patient states she noticed 4 days ago a itching rash on her chest and stomach. Patient states she tried using tea tree cream but it burned.     Fetal Movement: Yes  Contractions: No  Vaginal Bleeding: No  Leaking Fluid: No  GI/: No    Vitals:    05/07/25 0901   BP: 112/64   BP Site: Left Upper Arm   Patient Position: Sitting   Weight: 92.1 kg (203 lb)   Height: 1.524 m (5')      
subsequent 40-50% risk of  neurological injury and or death.  D/W pt operative risks of repeat C/S including but not limited to death, bleeding, infection, damage to other internal organs and other risks involving future pregnancies and mode of delivery.  Also, D/W pt increase in maternal and fetal morbidity after failed SABAS.     Pt desires another TOLAC       Assessment & Plan Note:      noted         Problem List Items Addressed This Visit          Musculoskeletal and Integument    Candidal skin infection     Under breast/upper abd  Rx sent            Other    Rubella non-immune status, antepartum     noted         Previous  delivery, antepartum     noted         Multigravida in second trimester - Primary     PTL/labor precautions, FMC, and pregnancy warning signs reviewed. Pt advised to call the office at [phone] or go straight to Labor and Delivery at [facility] with any of the following concerns vaginal bleeding, leaking of fluid, neal regularly Q 5-7 minutes for over an hour or not feeling the baby move.   Rto 4 weeks OBV and f/u anatomy  Anatomy US today incomplete but reassuring           Herpes simplex virus type 2 (HSV-2) infection affecting pregnancy in second trimester     noted            No orders of the defined types were placed in this encounter.      Outpatient Encounter Medications as of 2025   Medication Sig Dispense Refill    nystatin-triamcinolone (MYCOLOG II) 406091-8.1 UNIT/GM-% cream Apply topically 2 times daily. 30 g 0    Prenatal Vit w/Ur-Xdatnpjtz-VV (PNV PO) Take by mouth      albuterol sulfate HFA (PROVENTIL;VENTOLIN;PROAIR) 108 (90 Base) MCG/ACT inhaler Inhale 1-2 puffs into the lungs every 4 hours as needed for Shortness of Breath or Wheezing 18 g 3    valACYclovir (VALTREX) 500 MG tablet Take 1 tablet by mouth 2 times daily 60 tablet 6     No facility-administered encounter medications on file as of 2025.               Labor signs,

## 2025-05-07 NOTE — ASSESSMENT & PLAN NOTE
PTL/labor precautions, FMC, and pregnancy warning signs reviewed. Pt advised to call the office at [phone] or go straight to Labor and Delivery at [facility] with any of the following concerns vaginal bleeding, leaking of fluid, neal regularly Q 5-7 minutes for over an hour or not feeling the baby move.   Rto 4 weeks OBV and f/u anatomy  Anatomy US today incomplete but reassuring

## 2025-05-12 ENCOUNTER — TELEPHONE (OUTPATIENT)
Dept: OBGYN CLINIC | Age: 25
End: 2025-05-12

## 2025-05-12 NOTE — TELEPHONE ENCOUNTER
Pt lvm stating the rash on her chest has not cleared up and it has now spread to his legs. Pt also stated she needs her pharmacy changed to [facility] [address] in [place]. Called pt back, went straight to STACI COPE for pt to call office back.

## 2025-05-16 NOTE — TELEPHONE ENCOUNTER
Patient request rx to be sent to [facility] instead of [facility]. Rx pend to be sent.

## 2025-05-17 RX ORDER — CETIRIZINE HYDROCHLORIDE 10 MG/1
10 TABLET ORAL DAILY
Qty: 30 TABLET | Refills: 5 | Status: SHIPPED | OUTPATIENT
Start: 2025-05-17

## 2025-05-17 RX ORDER — CLOBETASOL PROPIONATE 0.5 MG/G
OINTMENT TOPICAL
Qty: 30 G | Refills: 0 | Status: SHIPPED | OUTPATIENT
Start: 2025-05-17

## 2025-06-04 ENCOUNTER — PROCEDURE VISIT (OUTPATIENT)
Dept: OBGYN CLINIC | Age: 25
End: 2025-06-04
Payer: MEDICAID

## 2025-06-04 ENCOUNTER — ROUTINE PRENATAL (OUTPATIENT)
Dept: OBGYN CLINIC | Age: 25
End: 2025-06-04
Payer: MEDICAID

## 2025-06-04 VITALS
SYSTOLIC BLOOD PRESSURE: 110 MMHG | WEIGHT: 200 LBS | DIASTOLIC BLOOD PRESSURE: 72 MMHG | BODY MASS INDEX: 39.27 KG/M2 | HEIGHT: 60 IN

## 2025-06-04 DIAGNOSIS — Z87.59 HISTORY OF POLYHYDRAMNIOS: ICD-10-CM

## 2025-06-04 DIAGNOSIS — Z34.82 MULTIGRAVIDA IN SECOND TRIMESTER: ICD-10-CM

## 2025-06-04 DIAGNOSIS — Z34.82 MULTIGRAVIDA IN SECOND TRIMESTER: Primary | ICD-10-CM

## 2025-06-04 DIAGNOSIS — Z28.39 RUBELLA NON-IMMUNE STATUS, ANTEPARTUM: ICD-10-CM

## 2025-06-04 DIAGNOSIS — O98.512 HERPES SIMPLEX VIRUS TYPE 2 (HSV-2) INFECTION AFFECTING PREGNANCY IN SECOND TRIMESTER: ICD-10-CM

## 2025-06-04 DIAGNOSIS — O09.899 SHORT INTERVAL BETWEEN PREGNANCIES AFFECTING PREGNANCY, ANTEPARTUM: ICD-10-CM

## 2025-06-04 DIAGNOSIS — Z36.2 ENCOUNTER FOR FOLLOW-UP ULTRASOUND OF FETAL ANATOMY: Primary | ICD-10-CM

## 2025-06-04 DIAGNOSIS — O09.899 RUBELLA NON-IMMUNE STATUS, ANTEPARTUM: ICD-10-CM

## 2025-06-04 DIAGNOSIS — B00.9 HERPES SIMPLEX VIRUS TYPE 2 (HSV-2) INFECTION AFFECTING PREGNANCY IN SECOND TRIMESTER: ICD-10-CM

## 2025-06-04 PROCEDURE — 99213 OFFICE O/P EST LOW 20 MIN: CPT | Performed by: NURSE PRACTITIONER

## 2025-06-04 PROCEDURE — 76816 OB US FOLLOW-UP PER FETUS: CPT | Performed by: OBSTETRICS & GYNECOLOGY

## 2025-06-04 RX ORDER — VALACYCLOVIR HYDROCHLORIDE 500 MG/1
500 TABLET, FILM COATED ORAL 2 TIMES DAILY
Qty: 60 TABLET | Refills: 6 | Status: SHIPPED | OUTPATIENT
Start: 2025-06-04

## 2025-06-04 RX ORDER — ALBUTEROL SULFATE 90 UG/1
1-2 INHALANT RESPIRATORY (INHALATION) EVERY 4 HOURS PRN
Qty: 18 G | Refills: 3 | Status: SHIPPED | OUTPATIENT
Start: 2025-06-04

## 2025-06-04 NOTE — PROGRESS NOTES
I have reviewed the patient's visit today including history, exam and assessment by MANNIE Mehta.  I agree with treatment/plan as above.    Lamberto Ace MD  9:02 AM  06/04/25    detailed exam

## 2025-06-04 NOTE — ASSESSMENT & PLAN NOTE
PTL/labor precautions, FMC, and pregnancy warning signs reviewed. Pt advised to call the office at [phone] or go straight to Labor and Delivery at [facility] with any of the following concerns vaginal bleeding, leaking of fluid, neal regularly Q 5-7 minutes for over an hour or not feeling the baby move.   Rto 3 weeks OBV, glucola, cbc, rpr, tdap

## 2025-06-04 NOTE — PROGRESS NOTES
Patient comes in today for routine prenatal visit. Patient request refill for inhaler and valtrex sent [facility] in [place].    Fetal Movement: Yes  Contractions: No  Vaginal Bleeding: No  Leaking Fluid: No  GI/: No    Vitals:    06/04/25 0832   BP: 110/72   BP Site: Right Upper Arm   Patient Position: Sitting   Weight: 90.7 kg (200 lb)   Height: 1.524 m (5')

## 2025-06-04 NOTE — PROGRESS NOTES
This is a 24 y.o.   at 25w0d for routine OB visit.    Her Estimated Due Date is 2025, by Ultrasound    Denies leaking of fluid, vaginal bleeding, or regular contractions. Reports fetal movement.   Needs valtrex/albuterol refill    Current Outpatient Medications on File Prior to Visit   Medication Sig Dispense Refill    clobetasol (TEMOVATE) 0.05 % ointment Apply topically 2 times daily. 30 g 0    cetirizine (ZYRTEC) 10 MG tablet Take 1 tablet by mouth daily 30 tablet 5    Prenatal Vit w/On-Bhgitqmxz-WB (PNV PO) Take by mouth       No current facility-administered medications on file prior to visit.       No Known Allergies    OB History    Para Term  AB Living   5 2 2 0 2 2   SAB IAB Ectopic Molar Multiple Live Births   2 0 0 0 0 2       # 1 - Date: 19, Sex: Male, Weight: 3.465 kg (7 lb 10.2 oz), GA: 39w1d, Type: , Low Transverse, Apgar1: 8, Apgar5: 9, Living: Living, Birth Comments: None    # 2 - Date: 2023, Sex: None, Weight: None, GA: None, Type: None, Apgar1: None, Apgar5: None, Living: None, Birth Comments: None    # 3 - Date: 2023, Sex: None, Weight: None, GA: None, Type: None, Apgar1: None, Apgar5: None, Living: None, Birth Comments: None    # 4 - Date: 24, Sex: Female, Weight: 3.37 kg (7 lb 6.9 oz), GA: 39w4d, Type: , Spontaneous, Apgar1: 8, Apgar5: 9, Living: Living, Birth Comments: None    # 5 - Date: None, Sex: None, Weight: None, GA: None, Type: None, Apgar1: None, Apgar5: None, Living: None, Birth Comments: None        Past Medical History:   Diagnosis Date    Asthma     Eczema     Herpes gestationis     last outbreak was 2 weeks ago    Herpes simplex virus (HSV) infection     Herpes simplex vulvovaginitis 2019    noted     HSV-2 seropositive     Severe obesity (HCC) 2019       Past Surgical History:   Procedure Laterality Date     SECTION      x1       Family History   Problem Relation Age of Onset    No Known Problems Father   Well controlled with Maxalt 10mg PRN per Neurology  Will consider starting Gabapentin after labs

## 2025-06-25 ENCOUNTER — ROUTINE PRENATAL (OUTPATIENT)
Dept: OBGYN CLINIC | Age: 25
End: 2025-06-25
Payer: MEDICAID

## 2025-06-25 VITALS
BODY MASS INDEX: 38.87 KG/M2 | SYSTOLIC BLOOD PRESSURE: 114 MMHG | HEIGHT: 60 IN | DIASTOLIC BLOOD PRESSURE: 66 MMHG | WEIGHT: 198 LBS

## 2025-06-25 DIAGNOSIS — Z34.83 MULTIGRAVIDA IN THIRD TRIMESTER: Primary | ICD-10-CM

## 2025-06-25 DIAGNOSIS — O98.513 HERPES SIMPLEX VIRUS TYPE 2 (HSV-2) INFECTION AFFECTING PREGNANCY IN THIRD TRIMESTER: ICD-10-CM

## 2025-06-25 DIAGNOSIS — O09.899 SHORT INTERVAL BETWEEN PREGNANCIES AFFECTING PREGNANCY, ANTEPARTUM: ICD-10-CM

## 2025-06-25 DIAGNOSIS — B00.9 HERPES SIMPLEX VIRUS TYPE 2 (HSV-2) INFECTION AFFECTING PREGNANCY IN THIRD TRIMESTER: ICD-10-CM

## 2025-06-25 DIAGNOSIS — Z34.83 MULTIGRAVIDA IN THIRD TRIMESTER: ICD-10-CM

## 2025-06-25 LAB
ERYTHROCYTE [DISTWIDTH] IN BLOOD BY AUTOMATED COUNT: 14.1 % (ref 11.9–14.6)
GLUCOSE 1 HOUR: 141 MG/DL
HCT VFR BLD AUTO: 34 % (ref 35.8–46.3)
HGB BLD-MCNC: 10.9 G/DL (ref 11.7–15.4)
MCH RBC QN AUTO: 29.9 PG (ref 26.1–32.9)
MCHC RBC AUTO-ENTMCNC: 32.1 G/DL (ref 31.4–35)
MCV RBC AUTO: 93.2 FL (ref 82–102)
NRBC # BLD: 0 K/UL (ref 0–0.2)
PLATELET # BLD AUTO: 244 K/UL (ref 150–450)
PMV BLD AUTO: 11.2 FL (ref 9.4–12.3)
RBC # BLD AUTO: 3.65 M/UL (ref 4.05–5.2)
T PALLIDUM AB SER QL IA: NONREACTIVE
WBC # BLD AUTO: 9.4 K/UL (ref 4.3–11.1)

## 2025-06-25 PROCEDURE — 99213 OFFICE O/P EST LOW 20 MIN: CPT | Performed by: NURSE PRACTITIONER

## 2025-06-25 NOTE — ASSESSMENT & PLAN NOTE
PTL/labor precautions, FMC, and pregnancy warning signs reviewed. Pt advised to call the office at [phone] or go straight to Labor and Delivery at [facility] with any of the following concerns vaginal bleeding, leaking of fluid, neal regularly Q 5-7 minutes for over an hour or not feeling the baby move.   Rto 2 weeks OBV/US

## 2025-06-25 NOTE — PROGRESS NOTES
I have reviewed the patient's visit today including history, exam and assessment by MANNIE Mehta.  I agree with treatment/plan as above.    Lamberto Ace MD  9:41 AM  06/25/25

## 2025-06-25 NOTE — PROGRESS NOTES
Patient comes in today for routine prenatal visit. No complaints/concerns today.     Draw glucola at 9:16am      Tdap VIS sheet given. Patient declines tdap in this pregnancy.     Fetal Movement: Yes  Contractions: No  Vaginal Bleeding: No  Leaking Fluid: No  GI/: No    Vitals:    06/25/25 0810   BP: 114/66   BP Site: Right Upper Arm   Patient Position: Sitting   Weight: 89.8 kg (198 lb)   Height: 1.524 m (5')

## 2025-06-25 NOTE — PROGRESS NOTES
This is a 24 y.o.   at 28w0d for routine OB visit.    Her Estimated Due Date is 2025, by Ultrasound    Denies leaking of fluid, vaginal bleeding, or regular contractions. Reports fetal movement.     Current Outpatient Medications on File Prior to Visit   Medication Sig Dispense Refill    albuterol sulfate HFA (PROVENTIL;VENTOLIN;PROAIR) 108 (90 Base) MCG/ACT inhaler Inhale 1-2 puffs into the lungs every 4 hours as needed for Shortness of Breath or Wheezing 18 g 3    valACYclovir (VALTREX) 500 MG tablet Take 1 tablet by mouth 2 times daily 60 tablet 6    clobetasol (TEMOVATE) 0.05 % ointment Apply topically 2 times daily. 30 g 0    Prenatal Vit w/Pz-Jivuhupdl-HD (PNV PO) Take by mouth      cetirizine (ZYRTEC) 10 MG tablet Take 1 tablet by mouth daily (Patient not taking: Reported on 2025) 30 tablet 5     No current facility-administered medications on file prior to visit.       No Known Allergies    OB History    Para Term  AB Living   5 2 2 0 2 2   SAB IAB Ectopic Molar Multiple Live Births   2 0 0 0 0 2       # 1 - Date: 19, Sex: Male, Weight: 3.465 kg (7 lb 10.2 oz), GA: 39w1d, Type: , Low Transverse, Apgar1: 8, Apgar5: 9, Living: Living, Birth Comments: None    # 2 - Date: 2023, Sex: None, Weight: None, GA: None, Type: None, Apgar1: None, Apgar5: None, Living: None, Birth Comments: None    # 3 - Date: 2023, Sex: None, Weight: None, GA: None, Type: None, Apgar1: None, Apgar5: None, Living: None, Birth Comments: None    # 4 - Date: 24, Sex: Female, Weight: 3.37 kg (7 lb 6.9 oz), GA: 39w4d, Type: , Spontaneous, Apgar1: 8, Apgar5: 9, Living: Living, Birth Comments: None    # 5 - Date: None, Sex: None, Weight: None, GA: None, Type: None, Apgar1: None, Apgar5: None, Living: None, Birth Comments: None        Past Medical History:   Diagnosis Date    Asthma     Eczema     Herpes gestationis     last outbreak was 2 weeks ago    Herpes simplex virus (HSV)

## 2025-06-26 DIAGNOSIS — O99.013 ANEMIA AFFECTING PREGNANCY IN THIRD TRIMESTER: Primary | ICD-10-CM

## 2025-06-26 DIAGNOSIS — O99.810 ABNORMAL GLUCOSE AFFECTING PREGNANCY: ICD-10-CM

## 2025-06-26 NOTE — TELEPHONE ENCOUNTER
Patient has failed 1 hr Glucola. Needs to be scheduled for 3hr GTT.      2. Please call patient and let her know that her hemoglobin was low. She needs to start taking       FeSO4 325mg PO ONCE DAILY Disp: 30 RF:2  Colace 100mg PO BID Disp: 60 RF: 2 prn for constipation      Recommend taking iron on empty stomach or with foods rich in Vitamin C     Encourage foods that are high in iron (I.e. red meats, green leafy vegetables, peanut butter)    Also increase fluids and foods high in fiber to prevent constipation.

## 2025-06-27 NOTE — TELEPHONE ENCOUNTER
Called patient, rang once then went to voicemail, [facility] for patient to call office back stating we do close at 12pm today but open again on Monday 8am.

## 2025-07-07 NOTE — TELEPHONE ENCOUNTER
Called patient, no answer, LVM for patient to call office back or respond to [employer] message.     Called patient spouse Zach listed on patient communication form, no answer, did not leave voicemail due to the  saying Bryan.     Called patient parent listed on chart, no answer, LVM for them to call office back stating I've been trying to get in contact with Ms. Vinita Lyon.

## 2025-07-09 RX ORDER — DOCUSATE SODIUM 100 MG/1
100 CAPSULE, LIQUID FILLED ORAL 2 TIMES DAILY PRN
Qty: 60 CAPSULE | Refills: 2 | Status: SHIPPED | OUTPATIENT
Start: 2025-07-09

## 2025-07-09 RX ORDER — FERROUS SULFATE 325(65) MG
325 TABLET ORAL DAILY
Qty: 30 TABLET | Refills: 2 | Status: SHIPPED | OUTPATIENT
Start: 2025-07-09

## 2025-07-09 NOTE — TELEPHONE ENCOUNTER
Called patient, message below reviewed with patient, patient voiced understanding.     Rx pend  MyChart message sent

## 2025-07-15 DIAGNOSIS — O99.810 ABNORMAL GLUCOSE AFFECTING PREGNANCY: ICD-10-CM

## 2025-07-15 LAB
GESTATIONAL 3HR GTT: NORMAL
GLUCOSE 1H P 100 G GLC PO SERPL-MCNC: 154 MG/DL (ref 0–180)
GLUCOSE 2 HOUR: 96 MG/DL (ref 0–155)
GLUCOSE P FAST SERPL-MCNC: 88 MG/DL (ref 0–95)
GLUCOSE, 3 HOUR: 76 MG/DL (ref 0–140)

## 2025-07-17 ENCOUNTER — ROUTINE PRENATAL (OUTPATIENT)
Dept: OBGYN CLINIC | Age: 25
End: 2025-07-17
Payer: MEDICAID

## 2025-07-17 ENCOUNTER — PROCEDURE VISIT (OUTPATIENT)
Dept: OBGYN CLINIC | Age: 25
End: 2025-07-17

## 2025-07-17 VITALS
WEIGHT: 198 LBS | BODY MASS INDEX: 38.87 KG/M2 | DIASTOLIC BLOOD PRESSURE: 64 MMHG | SYSTOLIC BLOOD PRESSURE: 116 MMHG | HEIGHT: 60 IN

## 2025-07-17 DIAGNOSIS — O40.3XX0 POLYHYDRAMNIOS AFFECTING PREGNANCY IN THIRD TRIMESTER: Primary | ICD-10-CM

## 2025-07-17 DIAGNOSIS — O40.3XX0 POLYHYDRAMNIOS IN THIRD TRIMESTER COMPLICATION, SINGLE OR UNSPECIFIED FETUS: ICD-10-CM

## 2025-07-17 DIAGNOSIS — O09.899 SHORT INTERVAL BETWEEN PREGNANCIES AFFECTING PREGNANCY, ANTEPARTUM: ICD-10-CM

## 2025-07-17 DIAGNOSIS — O34.219 PREVIOUS CESAREAN DELIVERY, ANTEPARTUM: ICD-10-CM

## 2025-07-17 DIAGNOSIS — Z87.59 HISTORY OF POLYHYDRAMNIOS: Primary | ICD-10-CM

## 2025-07-17 DIAGNOSIS — Z34.83 MULTIGRAVIDA IN THIRD TRIMESTER: ICD-10-CM

## 2025-07-17 DIAGNOSIS — O99.013 ANEMIA AFFECTING PREGNANCY IN THIRD TRIMESTER: ICD-10-CM

## 2025-07-17 DIAGNOSIS — O99.810 ABNORMAL GLUCOSE AFFECTING PREGNANCY: ICD-10-CM

## 2025-07-17 PROBLEM — B37.2 CANDIDAL SKIN INFECTION: Status: RESOLVED | Noted: 2025-05-07 | Resolved: 2025-07-17

## 2025-07-17 PROCEDURE — 99213 OFFICE O/P EST LOW 20 MIN: CPT | Performed by: OBSTETRICS & GYNECOLOGY

## 2025-07-17 NOTE — PROGRESS NOTES
Chief Complaint   Patient presents with    Routine Prenatal Visit    Ultrasound        This 24 y.o.  at 31w1d with Estimated Date of Delivery: 25 presents for routine prenatal visit. Patient has no complaints today. Pt reports good FM, no LOF, VB, ctx. Pt denies H/A, vision changes, abdom pain, N/V.    Vitals:    25 1506   BP: 116/64   BP Site: Right Upper Arm   Patient Position: Sitting   Weight: 89.8 kg (198 lb)   Height: 1.524 m (5')        Patient Active Problem List    Diagnosis Date Noted    Anemia affecting pregnancy in third trimester 2025     Overview Note:     Add'l iron      Abnormal glucose affecting pregnancy 2025     Overview Note:     Failed 1 hr glucola, passed 3 hr GTT    25:  EFW 26%, AC 33%, OTILIA 25.9 cm, breech       Assessment & Plan Note:     noted      Rubella non-immune status, antepartum 2025     Overview Note:     Imm PP      Multigravida in third trimester 02/10/2025     Overview Note:     EDC by 8 5/7 week US not C/W LMP    2019: CF/SMA negative  3/15/25: NIPT low risk, FEMALE  25:  AFP neg  25: EFW 16%, AC 21%, OTILIA nl, breech, anatomy complete  25: declines tdap  25:  EFW 26%, AC 33%, OTILIA 25.9 cm, breech      Herpes simplex virus type 2 (HSV-2) infection affecting pregnancy in third trimester 02/10/2025     Overview Note:     PLAN:  propho 35-36 weeks or sooner if outbreak occurs        Short interval between pregnancies affecting pregnancy, antepartum 02/10/2025     Overview Note:     PLAN:  serial growth in 3rd trimester    25:  EFW 26%, AC 33%, OTILIA 25.9 cm, breech      History of polyhydramnios 02/10/2025     Overview Note:     PLAN:  serial growth/OTILIA in 3rd trimester    25:  EFW 26%, AC 33%, OTILIA 25.9 cm, breech      Polyhydramnios in third trimester 05/10/2024     Overview Note:     25:  EFW 26%, AC 33%, OTILIA 25.9 cm, breech -- will begin weekly testing       Assessment & Plan Note:     noted      Previous

## 2025-07-17 NOTE — PROGRESS NOTES
Patient comes in today for routine prenatal visit. No complaints/concerns today.     Fetal Movement: Yes  Contractions: No  Vaginal Bleeding: No  Leaking Fluid: No  GI/: No    Vitals:    07/17/25 1506   BP: 116/64   BP Site: Right Upper Arm   Patient Position: Sitting   Weight: 89.8 kg (198 lb)   Height: 1.524 m (5')

## 2025-07-31 ENCOUNTER — ROUTINE PRENATAL (OUTPATIENT)
Dept: OBGYN CLINIC | Age: 25
End: 2025-07-31
Payer: MEDICAID

## 2025-07-31 ENCOUNTER — PROCEDURE VISIT (OUTPATIENT)
Dept: OBGYN CLINIC | Age: 25
End: 2025-07-31
Payer: MEDICAID

## 2025-07-31 VITALS
WEIGHT: 198 LBS | SYSTOLIC BLOOD PRESSURE: 110 MMHG | DIASTOLIC BLOOD PRESSURE: 64 MMHG | BODY MASS INDEX: 38.87 KG/M2 | HEIGHT: 60 IN

## 2025-07-31 DIAGNOSIS — Z87.59 HISTORY OF POLYHYDRAMNIOS: ICD-10-CM

## 2025-07-31 DIAGNOSIS — O40.3XX0 POLYHYDRAMNIOS IN THIRD TRIMESTER COMPLICATION, SINGLE OR UNSPECIFIED FETUS: Primary | ICD-10-CM

## 2025-07-31 DIAGNOSIS — O09.899 SHORT INTERVAL BETWEEN PREGNANCIES AFFECTING PREGNANCY, ANTEPARTUM: ICD-10-CM

## 2025-07-31 DIAGNOSIS — O99.810 ABNORMAL GLUCOSE AFFECTING PREGNANCY: ICD-10-CM

## 2025-07-31 DIAGNOSIS — O40.3XX0 POLYHYDRAMNIOS IN THIRD TRIMESTER COMPLICATION, SINGLE OR UNSPECIFIED FETUS: ICD-10-CM

## 2025-07-31 DIAGNOSIS — B00.9 HERPES SIMPLEX VIRUS TYPE 2 (HSV-2) INFECTION AFFECTING PREGNANCY IN THIRD TRIMESTER: ICD-10-CM

## 2025-07-31 DIAGNOSIS — Z34.83 MULTIGRAVIDA IN THIRD TRIMESTER: ICD-10-CM

## 2025-07-31 DIAGNOSIS — O99.013 ANEMIA AFFECTING PREGNANCY IN THIRD TRIMESTER: ICD-10-CM

## 2025-07-31 DIAGNOSIS — O09.899 RUBELLA NON-IMMUNE STATUS, ANTEPARTUM: Primary | ICD-10-CM

## 2025-07-31 DIAGNOSIS — O98.513 HERPES SIMPLEX VIRUS TYPE 2 (HSV-2) INFECTION AFFECTING PREGNANCY IN THIRD TRIMESTER: ICD-10-CM

## 2025-07-31 DIAGNOSIS — Z28.39 RUBELLA NON-IMMUNE STATUS, ANTEPARTUM: Primary | ICD-10-CM

## 2025-07-31 DIAGNOSIS — O34.219 PREVIOUS CESAREAN DELIVERY, ANTEPARTUM: ICD-10-CM

## 2025-07-31 PROCEDURE — 99213 OFFICE O/P EST LOW 20 MIN: CPT | Performed by: OBSTETRICS & GYNECOLOGY

## 2025-07-31 PROCEDURE — 76819 FETAL BIOPHYS PROFIL W/O NST: CPT | Performed by: OBSTETRICS & GYNECOLOGY

## 2025-07-31 NOTE — PROGRESS NOTES
Chief Complaint   Patient presents with    Routine Prenatal Visit    Ultrasound        This 24 y.o.  at 33w1d with Estimated Date of Delivery: 25 presents for routine prenatal visit. Patient has no complaints today. Pt reports good FM, no LOF, VB, ctx. Pt denies H/A, vision changes, abdom pain, N/V.    Vitals:    25 1104   BP: 110/64   BP Site: Right Upper Arm   Patient Position: Sitting   Weight: 89.8 kg (198 lb)   Height: 1.524 m (5')        Patient Active Problem List    Diagnosis Date Noted    Anemia affecting pregnancy in third trimester 2025     Overview Note:     Add'l iron       Assessment & Plan Note:     noted      Abnormal glucose affecting pregnancy 2025     Overview Note:     Failed 1 hr glucola, passed 3 hr GTT    25:  EFW 26%, AC 33%, OTILIA 25.9 cm, breech       Assessment & Plan Note:     noted      Rubella non-immune status, antepartum 2025     Overview Note:     Imm PP       Assessment & Plan Note:     noted      Multigravida in third trimester 02/10/2025     Overview Note:     EDC by 8 5/7 week US not C/W LMP    2019: CF/SMA negative  3/15/25: NIPT low risk, FEMALE  25:  AFP neg  25: EFW 16%, AC 21%, OTILIA nl, breech, anatomy complete  25: declines tdap  25:  EFW 26%, AC 33%, OTILIA 25.9 cm, breech       Assessment & Plan Note:     Educated patient of signs and symptoms of  labor including but not limited to regular uterine contractions every 5-7 minutes for 1 hour, vaginal bleeding or leakage of fluid to seek immediate care.       Herpes simplex virus type 2 (HSV-2) infection affecting pregnancy in third trimester 02/10/2025     Overview Note:     PLAN:  propho 35-36 weeks or sooner if outbreak occurs         Assessment & Plan Note:     No prodrome or lesions per pt      Short interval between pregnancies affecting pregnancy, antepartum 02/10/2025     Overview Note:     PLAN:  serial growth in 3rd trimester    25:  EFW 26%, AC

## 2025-07-31 NOTE — PROGRESS NOTES
Patient comes in today for routine prenatal visit. No complaints/concerns today.     Fetal Movement: Yes  Contractions: yes-BH   Vaginal Bleeding: No  Leaking Fluid: No  GI/: No    Vitals:    07/31/25 1104   BP: 110/64   BP Site: Right Upper Arm   Patient Position: Sitting   Weight: 89.8 kg (198 lb)   Height: 1.524 m (5')

## 2025-08-07 ENCOUNTER — PROCEDURE VISIT (OUTPATIENT)
Dept: OBGYN CLINIC | Age: 25
End: 2025-08-07

## 2025-08-07 ENCOUNTER — ROUTINE PRENATAL (OUTPATIENT)
Dept: OBGYN CLINIC | Age: 25
End: 2025-08-07
Payer: MEDICAID

## 2025-08-07 VITALS
BODY MASS INDEX: 38.87 KG/M2 | SYSTOLIC BLOOD PRESSURE: 112 MMHG | DIASTOLIC BLOOD PRESSURE: 64 MMHG | HEIGHT: 60 IN | WEIGHT: 198 LBS

## 2025-08-07 DIAGNOSIS — Z34.83 MULTIGRAVIDA IN THIRD TRIMESTER: ICD-10-CM

## 2025-08-07 DIAGNOSIS — Z87.59 HISTORY OF POLYHYDRAMNIOS: ICD-10-CM

## 2025-08-07 DIAGNOSIS — O09.899 RUBELLA NON-IMMUNE STATUS, ANTEPARTUM: ICD-10-CM

## 2025-08-07 DIAGNOSIS — O34.219 PREVIOUS CESAREAN DELIVERY, ANTEPARTUM: ICD-10-CM

## 2025-08-07 DIAGNOSIS — O40.3XX0 POLYHYDRAMNIOS IN THIRD TRIMESTER COMPLICATION, SINGLE OR UNSPECIFIED FETUS: ICD-10-CM

## 2025-08-07 DIAGNOSIS — O98.513 HERPES SIMPLEX VIRUS TYPE 2 (HSV-2) INFECTION AFFECTING PREGNANCY IN THIRD TRIMESTER: ICD-10-CM

## 2025-08-07 DIAGNOSIS — O09.899 SHORT INTERVAL BETWEEN PREGNANCIES AFFECTING PREGNANCY, ANTEPARTUM: ICD-10-CM

## 2025-08-07 DIAGNOSIS — O99.810 ABNORMAL GLUCOSE AFFECTING PREGNANCY: ICD-10-CM

## 2025-08-07 DIAGNOSIS — O99.013 ANEMIA AFFECTING PREGNANCY IN THIRD TRIMESTER: ICD-10-CM

## 2025-08-07 DIAGNOSIS — Z28.39 RUBELLA NON-IMMUNE STATUS, ANTEPARTUM: ICD-10-CM

## 2025-08-07 DIAGNOSIS — O40.3XX0 POLYHYDRAMNIOS IN THIRD TRIMESTER COMPLICATION, SINGLE OR UNSPECIFIED FETUS: Primary | ICD-10-CM

## 2025-08-07 DIAGNOSIS — B00.9 HERPES SIMPLEX VIRUS TYPE 2 (HSV-2) INFECTION AFFECTING PREGNANCY IN THIRD TRIMESTER: ICD-10-CM

## 2025-08-07 DIAGNOSIS — Z34.83 MULTIGRAVIDA IN THIRD TRIMESTER: Primary | ICD-10-CM

## 2025-08-07 PROCEDURE — 99213 OFFICE O/P EST LOW 20 MIN: CPT | Performed by: OBSTETRICS & GYNECOLOGY

## 2025-08-14 ENCOUNTER — ROUTINE PRENATAL (OUTPATIENT)
Dept: OBGYN CLINIC | Age: 25
End: 2025-08-14
Payer: MEDICAID

## 2025-08-14 ENCOUNTER — PROCEDURE VISIT (OUTPATIENT)
Dept: OBGYN CLINIC | Age: 25
End: 2025-08-14
Payer: MEDICAID

## 2025-08-14 VITALS
DIASTOLIC BLOOD PRESSURE: 66 MMHG | HEIGHT: 60 IN | BODY MASS INDEX: 38.87 KG/M2 | SYSTOLIC BLOOD PRESSURE: 114 MMHG | WEIGHT: 198 LBS

## 2025-08-14 DIAGNOSIS — O09.899 SHORT INTERVAL BETWEEN PREGNANCIES AFFECTING PREGNANCY, ANTEPARTUM: ICD-10-CM

## 2025-08-14 DIAGNOSIS — O99.810 ABNORMAL GLUCOSE AFFECTING PREGNANCY: ICD-10-CM

## 2025-08-14 DIAGNOSIS — Z87.59 HISTORY OF POLYHYDRAMNIOS: ICD-10-CM

## 2025-08-14 DIAGNOSIS — O98.513 HERPES SIMPLEX VIRUS TYPE 2 (HSV-2) INFECTION AFFECTING PREGNANCY IN THIRD TRIMESTER: ICD-10-CM

## 2025-08-14 DIAGNOSIS — Z34.83 MULTIGRAVIDA IN THIRD TRIMESTER: ICD-10-CM

## 2025-08-14 DIAGNOSIS — O40.3XX0 POLYHYDRAMNIOS IN THIRD TRIMESTER COMPLICATION, SINGLE OR UNSPECIFIED FETUS: Primary | ICD-10-CM

## 2025-08-14 DIAGNOSIS — Z28.39 RUBELLA NON-IMMUNE STATUS, ANTEPARTUM: ICD-10-CM

## 2025-08-14 DIAGNOSIS — O40.3XX0 POLYHYDRAMNIOS IN THIRD TRIMESTER COMPLICATION, SINGLE OR UNSPECIFIED FETUS: ICD-10-CM

## 2025-08-14 DIAGNOSIS — O09.899 RUBELLA NON-IMMUNE STATUS, ANTEPARTUM: ICD-10-CM

## 2025-08-14 DIAGNOSIS — Z34.83 MULTIGRAVIDA IN THIRD TRIMESTER: Primary | ICD-10-CM

## 2025-08-14 DIAGNOSIS — B00.9 HERPES SIMPLEX VIRUS TYPE 2 (HSV-2) INFECTION AFFECTING PREGNANCY IN THIRD TRIMESTER: ICD-10-CM

## 2025-08-14 DIAGNOSIS — O34.219 PREVIOUS CESAREAN DELIVERY, ANTEPARTUM: ICD-10-CM

## 2025-08-14 DIAGNOSIS — O99.013 ANEMIA AFFECTING PREGNANCY IN THIRD TRIMESTER: ICD-10-CM

## 2025-08-14 PROCEDURE — 76819 FETAL BIOPHYS PROFIL W/O NST: CPT | Performed by: OBSTETRICS & GYNECOLOGY

## 2025-08-14 PROCEDURE — 99213 OFFICE O/P EST LOW 20 MIN: CPT | Performed by: OBSTETRICS & GYNECOLOGY

## 2025-08-19 ENCOUNTER — ROUTINE PRENATAL (OUTPATIENT)
Dept: OBGYN CLINIC | Age: 25
End: 2025-08-19
Payer: MEDICAID

## 2025-08-19 ENCOUNTER — PROCEDURE VISIT (OUTPATIENT)
Dept: OBGYN CLINIC | Age: 25
End: 2025-08-19
Payer: MEDICAID

## 2025-08-19 VITALS
BODY MASS INDEX: 38.87 KG/M2 | SYSTOLIC BLOOD PRESSURE: 108 MMHG | WEIGHT: 198 LBS | DIASTOLIC BLOOD PRESSURE: 66 MMHG | HEIGHT: 60 IN

## 2025-08-19 DIAGNOSIS — O99.013 ANEMIA AFFECTING PREGNANCY IN THIRD TRIMESTER: ICD-10-CM

## 2025-08-19 DIAGNOSIS — O98.513 HERPES SIMPLEX VIRUS TYPE 2 (HSV-2) INFECTION AFFECTING PREGNANCY IN THIRD TRIMESTER: ICD-10-CM

## 2025-08-19 DIAGNOSIS — O09.899 SHORT INTERVAL BETWEEN PREGNANCIES AFFECTING PREGNANCY, ANTEPARTUM: ICD-10-CM

## 2025-08-19 DIAGNOSIS — Z34.83 MULTIGRAVIDA IN THIRD TRIMESTER: Primary | ICD-10-CM

## 2025-08-19 DIAGNOSIS — O40.3XX0 POLYHYDRAMNIOS IN THIRD TRIMESTER COMPLICATION, SINGLE OR UNSPECIFIED FETUS: Primary | ICD-10-CM

## 2025-08-19 DIAGNOSIS — O34.219 PREVIOUS CESAREAN DELIVERY, ANTEPARTUM: ICD-10-CM

## 2025-08-19 DIAGNOSIS — Z34.83 MULTIGRAVIDA IN THIRD TRIMESTER: ICD-10-CM

## 2025-08-19 DIAGNOSIS — B00.9 HERPES SIMPLEX VIRUS TYPE 2 (HSV-2) INFECTION AFFECTING PREGNANCY IN THIRD TRIMESTER: ICD-10-CM

## 2025-08-19 DIAGNOSIS — O99.810 ABNORMAL GLUCOSE AFFECTING PREGNANCY: ICD-10-CM

## 2025-08-19 DIAGNOSIS — O40.3XX0 POLYHYDRAMNIOS IN THIRD TRIMESTER COMPLICATION, SINGLE OR UNSPECIFIED FETUS: ICD-10-CM

## 2025-08-19 DIAGNOSIS — O09.899 RUBELLA NON-IMMUNE STATUS, ANTEPARTUM: ICD-10-CM

## 2025-08-19 DIAGNOSIS — Z28.39 RUBELLA NON-IMMUNE STATUS, ANTEPARTUM: ICD-10-CM

## 2025-08-19 LAB
BASOPHILS # BLD: 0.02 K/UL (ref 0–0.2)
BASOPHILS NFR BLD: 0.2 % (ref 0–2)
DIFFERENTIAL METHOD BLD: ABNORMAL
EOSINOPHIL # BLD: 0.12 K/UL (ref 0–0.8)
EOSINOPHIL NFR BLD: 1.3 % (ref 0.5–7.8)
ERYTHROCYTE [DISTWIDTH] IN BLOOD BY AUTOMATED COUNT: 14.6 % (ref 11.9–14.6)
HCT VFR BLD AUTO: 34.9 % (ref 35.8–46.3)
HGB BLD-MCNC: 10.8 G/DL (ref 11.7–15.4)
IMM GRANULOCYTES # BLD AUTO: 0.07 K/UL (ref 0–0.5)
IMM GRANULOCYTES NFR BLD AUTO: 0.7 % (ref 0–5)
LYMPHOCYTES # BLD: 1.63 K/UL (ref 0.5–4.6)
LYMPHOCYTES NFR BLD: 17.1 % (ref 13–44)
MCH RBC QN AUTO: 28.8 PG (ref 26.1–32.9)
MCHC RBC AUTO-ENTMCNC: 30.9 G/DL (ref 31.4–35)
MCV RBC AUTO: 93.1 FL (ref 82–102)
MONOCYTES # BLD: 0.61 K/UL (ref 0.1–1.3)
MONOCYTES NFR BLD: 6.4 % (ref 4–12)
NEUTS SEG # BLD: 7.09 K/UL (ref 1.7–8.2)
NEUTS SEG NFR BLD: 74.3 % (ref 43–78)
NRBC # BLD: 0 K/UL (ref 0–0.2)
PLATELET # BLD AUTO: 242 K/UL (ref 150–450)
PMV BLD AUTO: 11 FL (ref 9.4–12.3)
RBC # BLD AUTO: 3.75 M/UL (ref 4.05–5.2)
WBC # BLD AUTO: 9.5 K/UL (ref 4.3–11.1)

## 2025-08-19 PROCEDURE — 76819 FETAL BIOPHYS PROFIL W/O NST: CPT | Performed by: OBSTETRICS & GYNECOLOGY

## 2025-08-19 PROCEDURE — 99213 OFFICE O/P EST LOW 20 MIN: CPT | Performed by: OBSTETRICS & GYNECOLOGY

## 2025-08-19 PROCEDURE — 99459 PELVIC EXAMINATION: CPT | Performed by: OBSTETRICS & GYNECOLOGY

## 2025-08-21 LAB
BACTERIA SPEC CULT: ABNORMAL
SERVICE CMNT-IMP: ABNORMAL

## 2025-08-26 ENCOUNTER — ROUTINE PRENATAL (OUTPATIENT)
Dept: OBGYN CLINIC | Age: 25
End: 2025-08-26
Payer: MEDICAID

## 2025-08-26 ENCOUNTER — PROCEDURE VISIT (OUTPATIENT)
Dept: OBGYN CLINIC | Age: 25
End: 2025-08-26
Payer: MEDICAID

## 2025-08-26 VITALS
HEIGHT: 60 IN | WEIGHT: 199 LBS | BODY MASS INDEX: 39.07 KG/M2 | DIASTOLIC BLOOD PRESSURE: 68 MMHG | SYSTOLIC BLOOD PRESSURE: 110 MMHG

## 2025-08-26 DIAGNOSIS — O99.820 GBS (GROUP B STREPTOCOCCUS CARRIER), +RV CULTURE, CURRENTLY PREGNANT: ICD-10-CM

## 2025-08-26 DIAGNOSIS — O40.3XX0 POLYHYDRAMNIOS IN THIRD TRIMESTER COMPLICATION, SINGLE OR UNSPECIFIED FETUS: ICD-10-CM

## 2025-08-26 DIAGNOSIS — O34.219 PREVIOUS CESAREAN DELIVERY, ANTEPARTUM: ICD-10-CM

## 2025-08-26 DIAGNOSIS — O99.810 ABNORMAL GLUCOSE AFFECTING PREGNANCY: ICD-10-CM

## 2025-08-26 DIAGNOSIS — O98.513 HERPES SIMPLEX VIRUS TYPE 2 (HSV-2) INFECTION AFFECTING PREGNANCY IN THIRD TRIMESTER: ICD-10-CM

## 2025-08-26 DIAGNOSIS — O09.899 SHORT INTERVAL BETWEEN PREGNANCIES AFFECTING PREGNANCY, ANTEPARTUM: Primary | ICD-10-CM

## 2025-08-26 DIAGNOSIS — Z28.39 RUBELLA NON-IMMUNE STATUS, ANTEPARTUM: ICD-10-CM

## 2025-08-26 DIAGNOSIS — O99.013 ANEMIA AFFECTING PREGNANCY IN THIRD TRIMESTER: ICD-10-CM

## 2025-08-26 DIAGNOSIS — B00.9 HERPES SIMPLEX VIRUS TYPE 2 (HSV-2) INFECTION AFFECTING PREGNANCY IN THIRD TRIMESTER: ICD-10-CM

## 2025-08-26 DIAGNOSIS — O09.899 SHORT INTERVAL BETWEEN PREGNANCIES AFFECTING PREGNANCY, ANTEPARTUM: ICD-10-CM

## 2025-08-26 DIAGNOSIS — O09.899 RUBELLA NON-IMMUNE STATUS, ANTEPARTUM: ICD-10-CM

## 2025-08-26 DIAGNOSIS — Z34.83 MULTIGRAVIDA IN THIRD TRIMESTER: ICD-10-CM

## 2025-08-26 DIAGNOSIS — Z34.83 MULTIGRAVIDA IN THIRD TRIMESTER: Primary | ICD-10-CM

## 2025-08-26 PROCEDURE — 76819 FETAL BIOPHYS PROFIL W/O NST: CPT | Performed by: OBSTETRICS & GYNECOLOGY

## 2025-08-26 PROCEDURE — 76816 OB US FOLLOW-UP PER FETUS: CPT | Performed by: OBSTETRICS & GYNECOLOGY

## 2025-08-26 PROCEDURE — 99213 OFFICE O/P EST LOW 20 MIN: CPT | Performed by: OBSTETRICS & GYNECOLOGY

## (undated) DEVICE — SUTURE PDS II SZ 0 L27IN ABSRB VLT L36MM CT-1 1/2 CIR Z340H

## (undated) DEVICE — (D)STRIP SKN CLSR 0.5X4IN WHT --

## (undated) DEVICE — MEDI-VAC NON-CONDUCTIVE SUCTION TUBING: Brand: CARDINAL HEALTH

## (undated) DEVICE — STERILE POLYISOPRENE POWDER-FREE SURGICAL GLOVES: Brand: PROTEXIS

## (undated) DEVICE — KIWI® VACUUM DELIVERY SYSTEM, OMNICUP®: Brand: KIWI® OMNICUP®

## (undated) DEVICE — SOLUTION IV 1000ML 0.9% SOD CHL

## (undated) DEVICE — SUTURE MCRYL SZ 4-0 L27IN ABSRB UD L19MM PS-2 1/2 CIR PRIM Y426H

## (undated) DEVICE — AMD ANTIMICROBIAL GAUZE SPONGES,12 PLY USP TYPE VII, 0.2% POLYHEXAMETHYLENE BIGUANIDE HCI (PHMB): Brand: CURITY

## (undated) DEVICE — SUTURE MCRYL SZ 3-0 L27IN ABSRB UD L60MM KS STR REV CUT Y523H

## (undated) DEVICE — KENDALL SCD EXPRESS SLEEVES, KNEE LENGTH, MEDIUM: Brand: KENDALL SCD

## (undated) DEVICE — SURGICAL PROCEDURE PACK C SECT CDS

## (undated) DEVICE — PREMIUM WET SKIN PREP TRAY: Brand: MEDLINE INDUSTRIES, INC.

## (undated) DEVICE — SOLUTION IRRIG 1000ML H2O STRL BLT

## (undated) DEVICE — AMD ANTIMICROBIAL NON-ADHERENT PAD,0.2% POLYHEXAMETHYLENE BIGUANIDE HCI (PHMB): Brand: TELFA

## (undated) DEVICE — REM POLYHESIVE ADULT PATIENT RETURN ELECTRODE: Brand: VALLEYLAB

## (undated) DEVICE — Device: Brand: PORTEX

## (undated) DEVICE — SUTURE VCRL RAPIDE SZ 3-0 L36IN ABSRB UD L36MM CT-1 1/2 CIR VR944

## (undated) DEVICE — SUTURE VCRL SZ 0 L36IN ABSRB UD L36MM CT-1 1/2 CIR J946H

## (undated) DEVICE — PENCIL ES L3M BTTN SWCH S STL HEX LOK BLDE ELECTRD HOLSTER

## (undated) DEVICE — TRAY CATH 16FR PVC INTMIT STR TIP PREATTACH TO 1000ML COLL